# Patient Record
Sex: FEMALE | Race: WHITE | Employment: OTHER | URBAN - METROPOLITAN AREA
[De-identification: names, ages, dates, MRNs, and addresses within clinical notes are randomized per-mention and may not be internally consistent; named-entity substitution may affect disease eponyms.]

---

## 2024-03-11 ENCOUNTER — HOSPITAL ENCOUNTER (OUTPATIENT)
Dept: PHYSICAL THERAPY | Age: 69
Setting detail: THERAPIES SERIES
Discharge: HOME | End: 2024-03-11
Payer: MEDICARE

## 2024-03-11 DIAGNOSIS — M25.551 RIGHT HIP PAIN: Primary | ICD-10-CM

## 2024-03-11 PROCEDURE — 97112 NEUROMUSCULAR REEDUCATION: CPT

## 2024-03-11 PROCEDURE — 97161 PT EVAL LOW COMPLEX 20 MIN: CPT

## 2024-03-11 NOTE — PLAN OF CARE
[facility] - Outpatient Rehabilitation and Therapy [address] [place] [id number] office: [phone] fax: [phone]     Physical Therapy Initial Evaluation Certification      Dear Kyree Florence,*,    We had the pleasure of evaluating the following patient for physical therapy services at [facility].  A summary of our findings can be found in the initial assessment below.  This includes our plan of care.  If you have any questions or concerns regarding these findings, please do not hesitate to contact me at the office phone number listed above.  Thank you for the referral.     Physician Signature:_______________________________Date:__________________  By signing above (or electronic signature), therapist’s plan is approved by physician       Physical Therapy: TREATMENT/PROGRESS NOTE   Patient: Kirstin Chávez (68 y.o. female)   Examination Date: 2024   :  1955 MRN: [id number]   Visit #: 1   Insurance Allowable Auth Needed   BMN []Yes    [x]No    Insurance: Payor: MEDICARE / Plan: MEDICARE PART A AND B / Product Type: *No Product type* /   Insurance ID: [id number] - (Medicare)  Secondary Insurance (if applicable): [facility]   Treatment Diagnosis:     ICD-10-CM    1. Right hip pain  M25.551          Medical Diagnosis:  Pain in right hip [M25.551]   Referring Physician: Kyree Florence,*  PCP: Demetri Moody       Plan of care signed (Y/N):     Date of Patient follow up with Physician:      Progress Report/POC: EVAL today  POC update due: (10 visits /OR AUTH LIMITS, whichever is less)  4/10/2024                                             Precautions/ Contra-indications:           Latex allergy:  NO  Pacemaker:    NO  Contraindications for Manipulation: None  Date of Surgery: NA  Other:    Red Flags:  None    C-SSRS Triggered by Intake questionnaire:   [x] No, Questionnaire did not trigger screening.   [] Yes, Patient intake

## 2024-03-14 ENCOUNTER — HOSPITAL ENCOUNTER (OUTPATIENT)
Dept: PHYSICAL THERAPY | Age: 69
Setting detail: THERAPIES SERIES
Discharge: HOME | End: 2024-03-14
Payer: MEDICARE

## 2024-03-14 PROCEDURE — 97140 MANUAL THERAPY 1/> REGIONS: CPT

## 2024-03-14 PROCEDURE — 97112 NEUROMUSCULAR REEDUCATION: CPT

## 2024-03-14 NOTE — FLOWSHEET NOTE
up>down, hills (inclines>declines), prolonged sitting, elliptical     Relevant Medical History: L wrist fracture surgery March 2022. Borderline high BP with no medical intervention other than CPAP use for sleep apnea. July 2018 Cervical fusion C5-C7. February 2013 arthro surface implant R great toe.    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 Patient would eventually like to do something more structured.     OBJECTIVE EXAMINATION     3/11/24  ROM/Strength: (Blank cells denote NT)      Mvmt (norm) AROM L AROM R Notes PROM L PROM R Notes               LUMBAR Flex (90) 120        Ext (25) 30        SB (25) WNL  WNL Stretch in L sacrum. Limited SB in lumbar spine       Rotation (30) Restricted. Restricted. Primarily likes to rotate from hips.                    HIP Flex (120)    120 120     Abd (45)          ER (50)    35 46     IR (45)    45 35     Ext (20)                   KNEE Flex (140)          Ext (0)                     ANKLE DF (20)          PF (50)          Inversion (30)          Eversion (20)             MMT L          MMT  R Notes     LUMBAR  Flexion       Extension       Lateral flexion        Rotation          MMT L MMT R Notes       HIP  Flexion        Abduction 15.6# 3+/5 p!      ER 26.2# 23.9# p!      IR        Extension 13.6# 12.4# p!           KNEE  Flexion        Extension               ANKLE  DF        PF        Inversion        Eversion        Repeated Movements: [] Normal  [] Abnormal [x] N/A    Palpation:   Patient reported tenderness with palpation  Location: R glute med/min, piriformis, posterior to greater trochanter along gluteal tendons, deep rotators    Posture:   decreased lumbar lordosis and decreased gluteal muscle bulk and tone, especially in glute med/min area    Specific Joint Mobility

## 2024-03-19 ENCOUNTER — HOSPITAL ENCOUNTER (OUTPATIENT)
Dept: PHYSICAL THERAPY | Age: 69
Setting detail: THERAPIES SERIES
Discharge: HOME | End: 2024-03-19
Payer: MEDICARE

## 2024-03-19 PROCEDURE — 97112 NEUROMUSCULAR REEDUCATION: CPT

## 2024-03-19 PROCEDURE — 97140 MANUAL THERAPY 1/> REGIONS: CPT

## 2024-03-19 NOTE — FLOWSHEET NOTE
[facility] - Outpatient Rehabilitation and Therapy [address] [place] [address] office: [phone] fax: [phone]    Physical Therapy: TREATMENT/PROGRESS NOTE   Patient: Kirstin Chávez (68 y.o. female)   Examination Date: 2024   :  1955 MRN: [id number]   Visit #: 3   Insurance Allowable Auth Needed   BMN []Yes    [x]No    Insurance: Payor: MEDICARE / Plan: MEDICARE PART A AND B / Product Type: *No Product type* /   Insurance ID: [id number] - (Medicare)  Secondary Insurance (if applicable): [facility]   Treatment Diagnosis:   1. Right hip pain  M25.551        Medical Diagnosis:  Pain in right hip [M25.551]   Referring Physician: Kyree Florence,*  PCP: Demetri Moody       Plan of care signed (Y/N):     Date of Patient follow up with Physician:      Progress Report/POC: 24  POC update due: (10 visits /OR AUTH LIMITS, whichever is less)  2024                                             Precautions/ Contra-indications:           Latex allergy:  NO  Pacemaker:    NO  Contraindications for Manipulation: None  Date of Surgery: NA  Other:    Red Flags:  None    C-SSRS Triggered by Intake questionnaire:   [x] No, Questionnaire did not trigger screening.   [] Yes, Patient intake triggered further evaluation      [] C-SSRS Screening completed  [] PCP notified via Plan of Care  [] Emergency services notified     Preferred Language for Healthcare:   [x] English       [] other:    SUBJECTIVE EXAMINATION     Patient stated complaint: Admits that her R hip was very irritable and sore the rest of the day following previous session. Patient had an MRI on her hips this past Monday, which showed a partial glute med tear on both her R & L sides as well as a partial hamstring tear at its insertion on the pelvis on the R side and an anterior R hip labrum tear. Patient follows back up with Dr. Florence this Thursday.        Test used Initial score  3/11/24 2024   Pain

## 2024-03-21 ENCOUNTER — HOSPITAL ENCOUNTER (OUTPATIENT)
Dept: PHYSICAL THERAPY | Age: 69
Setting detail: THERAPIES SERIES
Discharge: HOME | End: 2024-03-21
Payer: MEDICARE

## 2024-03-21 PROCEDURE — 97110 THERAPEUTIC EXERCISES: CPT

## 2024-03-21 PROCEDURE — 97140 MANUAL THERAPY 1/> REGIONS: CPT

## 2024-03-21 NOTE — FLOWSHEET NOTE
Timed Code Tx Minutes 47 3       Total Treatment Minutes 47        Charge Justification:  (67914) THERAPEUTIC EXERCISE - Provided verbal/tactile cueing for activities related to strengthening, flexibility, endurance, ROM performed to prevent loss of range of motion, maintain or improve muscular strength or increase flexibility, following either an injury or surgery.   (00060) HOME EXERCISE PROGRAM - Reviewed/Progressed HEP activities related to strengthening, flexibility, endurance, ROM performed to prevent loss of range of motion, maintain or improve muscular strength or increase flexibility, following either an injury or surgery.  (73169) NEUROMUSCULAR RE-EDUCATION - Therapeutic procedure, 1 or more areas, each 15 minutes; neuromuscular reeducation of movement, balance, coordination, kinesthetic sense, posture, and/or proprioception for sitting and/or standing activities  (38982) HOME EXERCISE PROGRAM - Reviewed/Progressed HEP activities related to neuromuscular reeducation of movement, balance, coordination, kinesthetic sense, posture, and/or proprioception for sitting and/or standing activities    (83878) THERAPEUTIC ACTIVITY - use of dynamic activities to improve functional performance. (Ex include squatting, ascending/descending stairs, walking, bending, lifting, catching, throwing, pushing, pulling, jumping.)  Direct, one on one contact, billed in 15-minute increments.  (90790) MANUAL THERAPY -  Manual therapy techniques, 1 or more regions, each 15 minutes (Mobilization/manipulation, manual lymphatic drainage, manual traction) for the purpose of modulating pain, promoting relaxation,  increasing ROM, reducing/eliminating soft tissue swelling/inflammation/restriction, improving soft tissue extensibility and allowing for proper ROM for normal function with self care, mobility, lifting and ambulation  (63335) Needle insertion(s) without injection; 1 or 2 muscle(s).  (51180) Needle insertion(s) without injection;

## 2024-03-26 ENCOUNTER — APPOINTMENT (OUTPATIENT)
Dept: PHYSICAL THERAPY | Age: 69
End: 2024-03-26
Payer: MEDICARE

## 2024-03-28 ENCOUNTER — APPOINTMENT (OUTPATIENT)
Dept: PHYSICAL THERAPY | Age: 69
End: 2024-03-28
Payer: MEDICARE

## 2024-04-15 ENCOUNTER — TELEPHONE (OUTPATIENT)
Dept: ORTHOPEDIC SURGERY | Age: 69
End: 2024-04-15

## 2024-04-15 NOTE — TELEPHONE ENCOUNTER
General Question     Subject: MRI IMAGES  Patient and /or Facility Request: Kirstin Chávez   Contact Number:  [phone]     PATIENT CALLING REGARDING IF THE OFFICE ARE ABLE TO PULL THE IMAGES FOR HER HIP MRI FROM [facility]    THE REPORT IS IN CARE EVERYWHERE, BUT IF THE IMAGES ARE NOT THERE , SHE WILL STOP BY [facility]  TO BRING THEM TO HER APPOINTMENT ON 4/22/24.    PLEASE CALL BACK PATIENT AT THE ABOVE NUMBER.

## 2024-04-19 SDOH — HEALTH STABILITY: PHYSICAL HEALTH: ON AVERAGE, HOW MANY DAYS PER WEEK DO YOU ENGAGE IN MODERATE TO STRENUOUS EXERCISE (LIKE A BRISK WALK)?: 3 DAYS

## 2024-04-19 SDOH — HEALTH STABILITY: PHYSICAL HEALTH: ON AVERAGE, HOW MANY MINUTES DO YOU ENGAGE IN EXERCISE AT THIS LEVEL?: 40 MIN

## 2024-04-22 ENCOUNTER — OFFICE VISIT (OUTPATIENT)
Dept: ORTHOPEDIC SURGERY | Age: 69
End: 2024-04-22
Payer: MEDICARE

## 2024-04-22 VITALS — WEIGHT: 130 LBS | BODY MASS INDEX: 21.66 KG/M2 | HEIGHT: 65 IN

## 2024-04-22 DIAGNOSIS — M53.3 SI (SACROILIAC) JOINT DYSFUNCTION: Primary | ICD-10-CM

## 2024-04-22 DIAGNOSIS — M25.551 PAIN OF RIGHT HIP: ICD-10-CM

## 2024-04-22 PROCEDURE — G8420 CALC BMI NORM PARAMETERS: HCPCS | Performed by: ORTHOPAEDIC SURGERY

## 2024-04-22 PROCEDURE — 1036F TOBACCO NON-USER: CPT | Performed by: ORTHOPAEDIC SURGERY

## 2024-04-22 PROCEDURE — 1123F ACP DISCUSS/DSCN MKR DOCD: CPT | Performed by: ORTHOPAEDIC SURGERY

## 2024-04-22 PROCEDURE — 3017F COLORECTAL CA SCREEN DOC REV: CPT | Performed by: ORTHOPAEDIC SURGERY

## 2024-04-22 PROCEDURE — G8400 PT W/DXA NO RESULTS DOC: HCPCS | Performed by: ORTHOPAEDIC SURGERY

## 2024-04-22 PROCEDURE — 1090F PRES/ABSN URINE INCON ASSESS: CPT | Performed by: ORTHOPAEDIC SURGERY

## 2024-04-22 PROCEDURE — G8427 DOCREV CUR MEDS BY ELIG CLIN: HCPCS | Performed by: ORTHOPAEDIC SURGERY

## 2024-04-22 PROCEDURE — 99204 OFFICE O/P NEW MOD 45 MIN: CPT | Performed by: ORTHOPAEDIC SURGERY

## 2024-04-22 RX ORDER — ROSUVASTATIN CALCIUM 5 MG/1
5 TABLET, COATED ORAL EVERY OTHER DAY
COMMUNITY
Start: 2024-03-13

## 2024-04-22 NOTE — PROGRESS NOTES
Social History     Socioeconomic History    Marital status:      Spouse name: None    Number of children: None    Years of education: None    Highest education level: None   Tobacco Use    Smoking status: Never     Social Determinants of Health     Physical Activity: Insufficiently Active (4/19/2024)    Exercise Vital Sign     Days of Exercise per Week: 3 days     Minutes of Exercise per Session: 40 min       Current Outpatient Medications   Medication Sig Dispense Refill    rosuvastatin (CRESTOR) 5 MG tablet Take 1 tablet by mouth every other day       No current facility-administered medications for this visit.       Allergies   Allergen Reactions    Codeine      Upset stomach       Vital signs:  Ht 1.638 m (5' 4.5\")   Wt 59 kg (130 lb)   BMI 21.97 kg/m²        Constitutional: The physical examination finds the patient to be well-developed and well-nourished.  The patient is alert and oriented x3 and was cooperative throughout the visit.  Neuro: no focal deficits noted. Normal mood, judgement, decision making  Eyes: sclera clear, EOMI  Ears: Normal external ear  Mouth:  No perioral lesions  Pulm: Respirations unlabored and regular  Pulse: Extremities well perfused, warm, capillary refill < 2 seconds  Musculoskeletal:        Hip Examination: right    Skin/Inspection: No skin lesions, cellulitis, or extreme edema in the lower extremities.     Standing/Walking: normal gait, negative Trendelenburg sign.      Supine/Side Lying Exam: Non tender around the major bony prominences  full range with pain  Flexion arc 0 to 120 degrees flexion arc, IR 25 degrees, ER 45 degrees   Deep Flexion test Positive  FADIR Positive  KRISTINA Positive for SI joint pain mostly  Resisted Abduction 4+ to 5/5  quite good   Resisted Adduction 5/5   Resisted  Flexion 5/5  Athletic Pubalgia: negative   Only mild tender at greater trochanter and glut insertion  Leg Length: equal    Prone: Absent  hip flexion contracture   Non tender

## 2024-04-23 PROBLEM — S62.102A: Status: ACTIVE | Noted: 2022-02-21

## 2024-04-23 PROBLEM — S52.502A CLOSED FRACTURE OF LEFT DISTAL RADIUS: Status: ACTIVE | Noted: 2022-03-03

## 2024-04-23 PROBLEM — M48.02 CERVICAL STENOSIS OF SPINE: Status: ACTIVE | Noted: 2018-05-20

## 2024-04-23 PROBLEM — M18.12 PRIMARY OSTEOARTHRITIS OF FIRST CARPOMETACARPAL JOINT OF LEFT HAND: Status: ACTIVE | Noted: 2017-11-14

## 2024-04-23 PROBLEM — G47.33 OSA (OBSTRUCTIVE SLEEP APNEA): Status: ACTIVE | Noted: 2023-01-03

## 2024-04-23 PROBLEM — Z98.1 S/P CERVICAL SPINAL FUSION: Status: ACTIVE | Noted: 2018-07-24

## 2024-04-23 PROBLEM — M54.12 CERVICAL RADICULOPATHY: Status: ACTIVE | Noted: 2018-05-20

## 2024-04-23 PROBLEM — M25.551 PAIN OF RIGHT HIP JOINT: Status: ACTIVE | Noted: 2024-03-13

## 2024-04-23 RX ORDER — CELECOXIB 200 MG/1
200 CAPSULE ORAL 2 TIMES DAILY
COMMUNITY
Start: 2022-11-11

## 2024-04-23 RX ORDER — MELOXICAM 7.5 MG/1
7.5 TABLET ORAL 2 TIMES DAILY
COMMUNITY
Start: 2024-03-21

## 2024-04-23 RX ORDER — PRAVASTATIN SODIUM 20 MG
20 TABLET ORAL DAILY
COMMUNITY
Start: 2024-02-02

## 2024-04-24 ENCOUNTER — OFFICE VISIT (OUTPATIENT)
Dept: ORTHOPEDIC SURGERY | Age: 69
End: 2024-04-24
Payer: MEDICARE

## 2024-04-24 VITALS — BODY MASS INDEX: 21.66 KG/M2 | WEIGHT: 130 LBS | HEIGHT: 65 IN

## 2024-04-24 DIAGNOSIS — G89.29 CHRONIC RIGHT SI JOINT PAIN: ICD-10-CM

## 2024-04-24 DIAGNOSIS — M53.3 SI (SACROILIAC) JOINT DYSFUNCTION: Primary | ICD-10-CM

## 2024-04-24 DIAGNOSIS — M53.3 CHRONIC RIGHT SI JOINT PAIN: ICD-10-CM

## 2024-04-24 PROCEDURE — 99202 OFFICE O/P NEW SF 15 MIN: CPT | Performed by: INTERNAL MEDICINE

## 2024-04-24 PROCEDURE — G8427 DOCREV CUR MEDS BY ELIG CLIN: HCPCS | Performed by: INTERNAL MEDICINE

## 2024-04-24 PROCEDURE — 20605 DRAIN/INJ JOINT/BURSA W/O US: CPT | Performed by: INTERNAL MEDICINE

## 2024-04-24 PROCEDURE — G8400 PT W/DXA NO RESULTS DOC: HCPCS | Performed by: INTERNAL MEDICINE

## 2024-04-24 PROCEDURE — G8420 CALC BMI NORM PARAMETERS: HCPCS | Performed by: INTERNAL MEDICINE

## 2024-04-24 PROCEDURE — 1123F ACP DISCUSS/DSCN MKR DOCD: CPT | Performed by: INTERNAL MEDICINE

## 2024-04-24 PROCEDURE — 1090F PRES/ABSN URINE INCON ASSESS: CPT | Performed by: INTERNAL MEDICINE

## 2024-04-24 PROCEDURE — 3017F COLORECTAL CA SCREEN DOC REV: CPT | Performed by: INTERNAL MEDICINE

## 2024-04-24 PROCEDURE — 1036F TOBACCO NON-USER: CPT | Performed by: INTERNAL MEDICINE

## 2024-04-24 RX ORDER — LIDOCAINE HYDROCHLORIDE 10 MG/ML
5 INJECTION, SOLUTION INFILTRATION; PERINEURAL ONCE
Status: COMPLETED | OUTPATIENT
Start: 2024-04-24 | End: 2024-04-24

## 2024-04-24 RX ORDER — ROPIVACAINE HYDROCHLORIDE 5 MG/ML
2 INJECTION, SOLUTION EPIDURAL; INFILTRATION; PERINEURAL ONCE
Status: COMPLETED | OUTPATIENT
Start: 2024-04-24 | End: 2024-04-24

## 2024-04-24 RX ORDER — METHYLPREDNISOLONE ACETATE 40 MG/ML
40 INJECTION, SUSPENSION INTRA-ARTICULAR; INTRALESIONAL; INTRAMUSCULAR; SOFT TISSUE ONCE
Status: COMPLETED | OUTPATIENT
Start: 2024-04-24 | End: 2024-04-24

## 2024-04-24 RX ADMIN — ROPIVACAINE HYDROCHLORIDE 2 ML: 5 INJECTION, SOLUTION EPIDURAL; INFILTRATION; PERINEURAL at 12:16

## 2024-04-24 RX ADMIN — LIDOCAINE HYDROCHLORIDE 5 ML: 10 INJECTION, SOLUTION INFILTRATION; PERINEURAL at 12:16

## 2024-04-24 RX ADMIN — METHYLPREDNISOLONE ACETATE 40 MG: 40 INJECTION, SUSPENSION INTRA-ARTICULAR; INTRALESIONAL; INTRAMUSCULAR; SOFT TISSUE at 12:16

## 2024-04-30 NOTE — PROGRESS NOTES
then advanced in the same needle tract and the needle  was advanced into the sacroiliac joint adjusting the position of the needle tip as needed to ensure intra-articular entry.  Loss of resistance was appreciated and the injection was completed with 1 cc of Depo-Medrol and 2 cc of 0.5% ropivacaine.  Patient tolerated this with minimal discomfort.    Images stored.    Band-Aid to seal the puncture wound    Technically successful injection           Other Outside Imaging and Testing Personally Reviewed:    US GUIDED NEEDLE PLACEMENT    Result Date: 4/24/2024  Radiology result is complete; follow up with provider / physician office for radiology results              Assessment   Impression: .    Encounter Diagnoses   Name Primary?    SI (sacroiliac) joint dysfunction Yes    Chronic right SI joint pain               Plan:     Postinjection protocol and follow up with Dr. Monserrat Yusuf  as scheduled.   Pain diary provided         Orders:        Orders Placed This Encounter   Procedures    US GUIDED NEEDLE PLACEMENT     Standing Status:   Future     Number of Occurrences:   1     Standing Expiration Date:   4/24/2025     Order Specific Question:   Reason for exam:     Answer:   CSI    WA ARTHROCENTESIS ASPIR&/INJ INTERM JT/BURS W/O US         Pradip Madrigal MD.      Disclaimer:    \"This note was dictated with voice recognition software. Though review and correction are routine, we apologize for any errors.\"

## 2024-05-07 ENCOUNTER — OFFICE VISIT (OUTPATIENT)
Dept: ORTHOPEDIC SURGERY | Age: 69
End: 2024-05-07
Payer: MEDICARE

## 2024-05-07 ENCOUNTER — TELEPHONE (OUTPATIENT)
Dept: ORTHOPEDIC SURGERY | Age: 69
End: 2024-05-07

## 2024-05-07 VITALS — HEIGHT: 65 IN | BODY MASS INDEX: 21.99 KG/M2 | RESPIRATION RATE: 12 BRPM | WEIGHT: 132 LBS

## 2024-05-07 DIAGNOSIS — M16.11 PRIMARY OSTEOARTHRITIS OF RIGHT HIP: Primary | ICD-10-CM

## 2024-05-07 PROCEDURE — 99213 OFFICE O/P EST LOW 20 MIN: CPT

## 2024-05-07 PROCEDURE — G8400 PT W/DXA NO RESULTS DOC: HCPCS

## 2024-05-07 PROCEDURE — 1123F ACP DISCUSS/DSCN MKR DOCD: CPT

## 2024-05-07 PROCEDURE — 3017F COLORECTAL CA SCREEN DOC REV: CPT

## 2024-05-07 PROCEDURE — 1036F TOBACCO NON-USER: CPT

## 2024-05-07 PROCEDURE — 1090F PRES/ABSN URINE INCON ASSESS: CPT

## 2024-05-07 PROCEDURE — G8420 CALC BMI NORM PARAMETERS: HCPCS

## 2024-05-07 PROCEDURE — G8427 DOCREV CUR MEDS BY ELIG CLIN: HCPCS

## 2024-05-07 RX ORDER — ROPIVACAINE HYDROCHLORIDE 5 MG/ML
14 INJECTION, SOLUTION EPIDURAL; INFILTRATION; PERINEURAL ONCE
Status: COMPLETED | OUTPATIENT
Start: 2024-05-07 | End: 2024-05-07

## 2024-05-07 RX ORDER — TRIAMCINOLONE ACETONIDE 40 MG/ML
80 INJECTION, SUSPENSION INTRA-ARTICULAR; INTRAMUSCULAR ONCE
Status: COMPLETED | OUTPATIENT
Start: 2024-05-07 | End: 2024-05-07

## 2024-05-07 RX ADMIN — TRIAMCINOLONE ACETONIDE 80 MG: 40 INJECTION, SUSPENSION INTRA-ARTICULAR; INTRAMUSCULAR at 13:53

## 2024-05-07 RX ADMIN — ROPIVACAINE HYDROCHLORIDE 14 ML: 5 INJECTION, SOLUTION EPIDURAL; INFILTRATION; PERINEURAL at 13:53

## 2024-05-07 NOTE — PROGRESS NOTES
5/7/24  1:10 PM        NDC: 95573-753-21   -   Ropivacaine 0.5 %    LOT: [id number]    COMMENT: RIGHT HIP INTRA-ARTICULAR CORTISONE INJECTION      NDC: 3514-1818-36   -   Kenalog 40mg    LOT: [id number]    COMMENT: RIGHT HIP INTRA-ARTICULAR CORTISONE INJECTION

## 2024-05-07 NOTE — TELEPHONE ENCOUNTER
Surgery and/or Procedure Scheduling     Contact Name: SINGH VICTORINO  Surgical/Procedure Request: IAC RT HIP INJECTION  Patient Contact Number: [phone]    PATIENT CALLED TO SPEAK WITH CLINICAL TO SCHEDULE INJECTION-IAC RT HIP    PLEASE ADVISE

## 2024-05-07 NOTE — PROGRESS NOTES
Date:  2024    Name:  Kirstin Chávez  Address:  [address] [place] [id number]    :  1955      Age:   68 y.o.    SSN:  [id number]      Medical Record Number:  [id number]    Reason for Visit:    Chief Complaint    Hip Pain (U/S right hip IAC )      DOS:2024     HPI: Kirstin Chávez is a 68 y.o. female here today for prescheduled right hip IAC. She saw Dr. Madrigal on 24 and underwent SI joint injection. She notes receiving 2 days of relief before symptoms are returned. He is complaining of posterior hip burning pain worse with ambulation.     The patient denies any bowel/bladder symptoms, or any numbness, tingling, or weakness down the affected thigh or leg. The patient denies any prior hip injuries, surgeries, or any childhood history of hip disorders.    Kirstin Chávez is currently retired with her .      Pain Assessment  Location of Pain: Hip  Location Modifiers: Right  Severity of Pain: 3  Quality of Pain: Sharp, Aching, Other (Comment) (BURNING)  Duration of Pain: Persistent  Frequency of Pain: Constant  Aggravating Factors: Bending, Walking, Stairs  Limiting Behavior: Yes  Relieving Factors: Rest  Result of Injury: No  Work-Related Injury: No  Are there other pain locations you wish to document?: No  ROS: Review of systems reviewed from Patient History Form completed today and available in the patient's chart under the Media tab.       History reviewed. No pertinent past medical history.     Past Surgical History:   Procedure Laterality Date    FOOT SURGERY         Family History   Problem Relation Age of Onset    Hypertension Mother     Diabetes Mother     Stroke Father        Social History     Socioeconomic History    Marital status:      Spouse name: None    Number of children: None    Years of education: None    Highest education level: None   Tobacco Use    Smoking status: Never     Social Determinants of Health     Physical Activity: Insufficiently Active (2024)

## 2024-05-10 ENCOUNTER — TELEPHONE (OUTPATIENT)
Dept: ORTHOPEDIC SURGERY | Age: 69
End: 2024-05-10

## 2024-05-10 NOTE — TELEPHONE ENCOUNTER
General Question     Subject: R HIP INJECTION QUESTIONS   Patient and /or Facility Request: Kirstin Chávez   Contact Number: [phone]       PATIENT CALLED IN ABOUT HER RT HIP INJECTION. SHE HAVING STOMACH ISSUE. PTAIENT REQ A CALL BACK..    PLEASE ADVISE

## 2024-06-19 ENCOUNTER — OFFICE VISIT (OUTPATIENT)
Dept: ORTHOPEDIC SURGERY | Age: 69
End: 2024-06-19
Payer: MEDICARE

## 2024-06-19 VITALS — BODY MASS INDEX: 21.99 KG/M2 | HEIGHT: 65 IN | WEIGHT: 132 LBS

## 2024-06-19 DIAGNOSIS — M53.3 SI (SACROILIAC) JOINT DYSFUNCTION: ICD-10-CM

## 2024-06-19 DIAGNOSIS — M16.11 PRIMARY OSTEOARTHRITIS OF RIGHT HIP: Primary | ICD-10-CM

## 2024-06-19 PROCEDURE — G8420 CALC BMI NORM PARAMETERS: HCPCS | Performed by: ORTHOPAEDIC SURGERY

## 2024-06-19 PROCEDURE — 99214 OFFICE O/P EST MOD 30 MIN: CPT | Performed by: ORTHOPAEDIC SURGERY

## 2024-06-19 PROCEDURE — 1036F TOBACCO NON-USER: CPT | Performed by: ORTHOPAEDIC SURGERY

## 2024-06-19 PROCEDURE — 1090F PRES/ABSN URINE INCON ASSESS: CPT | Performed by: ORTHOPAEDIC SURGERY

## 2024-06-19 PROCEDURE — 1123F ACP DISCUSS/DSCN MKR DOCD: CPT | Performed by: ORTHOPAEDIC SURGERY

## 2024-06-19 PROCEDURE — G8400 PT W/DXA NO RESULTS DOC: HCPCS | Performed by: ORTHOPAEDIC SURGERY

## 2024-06-19 PROCEDURE — G8427 DOCREV CUR MEDS BY ELIG CLIN: HCPCS | Performed by: ORTHOPAEDIC SURGERY

## 2024-06-19 PROCEDURE — 3017F COLORECTAL CA SCREEN DOC REV: CPT | Performed by: ORTHOPAEDIC SURGERY

## 2024-06-19 NOTE — PROGRESS NOTES
Chief Complaint  Hip Pain (Right hip)      History of Present Illness:  Kirstin Chávez is a pleasant 69 y.o. female here for reassessment of right buttock dominant and C-spine pain.  She had 4 weeks of relief to a steroid injection given by my physician assistant 6 weeks ago.  It relieved her pain with walking stairs and hiking and sitting.  It felt pretty good.  Since then it has worn off.  Her pain is constant.  No setbacks.  Of note she had no relief to prior SI joint injection by my partner Dr. Madrigal.      Medical History:  Patient's medications, allergies, past medical, surgical, social and family histories were reviewed and updated as appropriate.    Pain Assessment  Location of Pain: Hip  Location Modifiers: Right  Severity of Pain: 2  Quality of Pain: Sharp, Dull  Frequency of Pain: Constant  Aggravating Factors: Walking, Stairs, Other (Comment) (hills, sitting)  Limiting Behavior: Yes  Relieving Factors: Rest, Ice  Result of Injury: No  Work-Related Injury: No  ROS: Review of systems reviewed from Patient History Form completed today and available in the patient's chart under the Media tab.      Pertinent items are noted in HPI  Review of systems reviewed from Patient History Form completed today and available in the patient's chart under the Media tab.       Vital Signs:  Ht 1.651 m (5' 5\")   Wt 59.9 kg (132 lb)   BMI 21.97 kg/m²         Neuro: Alert & oriented x 3,  normal,  no focal deficits noted. Normal affect.  Eyes: sclera clear  Ears: Normal external ear  Mouth:  No perioral lesions  Pulm: Respirations unlabored and regular  Pulse: Extremities well perfused. 2+ peripheral pulses.  Skin: Warm. No ulcerations.      Constitutional: The physical examination finds the patient to be well-developed and well-nourished.  The patient is alert and oriented x3 and was cooperative throughout the visit.    Hip Examination: right    Skin/Inspection: No skin lesions, cellulitis, or extreme edema in the lower

## 2024-07-01 ENCOUNTER — OFFICE VISIT (OUTPATIENT)
Dept: ORTHOPEDIC SURGERY | Age: 69
End: 2024-07-01

## 2024-07-01 VITALS — HEIGHT: 65 IN | BODY MASS INDEX: 21.99 KG/M2 | WEIGHT: 132 LBS

## 2024-07-01 DIAGNOSIS — M16.11 PRIMARY OSTEOARTHRITIS OF RIGHT HIP: Primary | ICD-10-CM

## 2024-07-01 RX ORDER — BUPIVACAINE HYDROCHLORIDE 2.5 MG/ML
5 INJECTION, SOLUTION INFILTRATION; PERINEURAL ONCE
Status: COMPLETED | OUTPATIENT
Start: 2024-07-01 | End: 2024-07-01

## 2024-07-01 RX ORDER — LIDOCAINE HYDROCHLORIDE 10 MG/ML
5 INJECTION, SOLUTION INFILTRATION; PERINEURAL ONCE
Status: COMPLETED | OUTPATIENT
Start: 2024-07-01 | End: 2024-07-01

## 2024-07-01 RX ADMIN — LIDOCAINE HYDROCHLORIDE 5 ML: 10 INJECTION, SOLUTION INFILTRATION; PERINEURAL at 15:46

## 2024-07-01 RX ADMIN — BUPIVACAINE HYDROCHLORIDE 12.5 MG: 2.5 INJECTION, SOLUTION INFILTRATION; PERINEURAL at 15:46

## 2024-07-04 RX ORDER — CELECOXIB 200 MG/1
200 CAPSULE ORAL DAILY PRN
Qty: 60 CAPSULE | Refills: 1 | Status: SHIPPED | OUTPATIENT
Start: 2024-07-04

## 2024-07-04 NOTE — PROGRESS NOTES
effusion      Skin: There are no rashes, ulcerations or lesions.      Gait: Nonantalgic      Neurovascular - non focal and intact       Additional Comments:        Additional Examinations:                    Office Imaging Results/Procedures PerformedToday:        Office Procedures:     Orders Placed This Encounter   Procedures    US MISC LIMITED     Order Specific Question:   Reason for exam:     Answer:   PRP           Other Outside Imaging and Testing Personally Reviewed:    US MISC LIMITED    Result Date: 7/1/2024  Radiology result is complete; follow up with provider / physician office for radiology results              Assessment   Impression: .    Encounter Diagnosis   Name Primary?    Primary osteoarthritis of right hip Yes              Plan:     Postinjection protocol and follow-up in 10 to 14 days  Activity modification -hip precautions and caution against overuse  Medical pain management: NSAID: Celebrex as needed or, Tylenol.  Activity modification for the next 72 hours and transition to maintenance HEP  Long-term follow-up with Dr. MARY Yusuf as scheduled     Orders:        Orders Placed This Encounter   Procedures    US MISC LIMITED     Order Specific Question:   Reason for exam:     Answer:   LO Madrigal MD.      Disclaimer:    \"This note was dictated with voice recognition software. Though review and correction are routine, we apologize for any errors.\"

## 2024-08-05 ENCOUNTER — OFFICE VISIT (OUTPATIENT)
Dept: ORTHOPEDIC SURGERY | Age: 69
End: 2024-08-05
Payer: MEDICARE

## 2024-08-05 VITALS — BODY MASS INDEX: 21.99 KG/M2 | WEIGHT: 132 LBS | HEIGHT: 65 IN

## 2024-08-05 DIAGNOSIS — M16.11 PRIMARY OSTEOARTHRITIS OF RIGHT HIP: Primary | ICD-10-CM

## 2024-08-05 DIAGNOSIS — M25.551 PAIN OF RIGHT HIP: ICD-10-CM

## 2024-08-05 DIAGNOSIS — M54.50 LUMBAR PAIN: ICD-10-CM

## 2024-08-05 PROCEDURE — 99214 OFFICE O/P EST MOD 30 MIN: CPT | Performed by: INTERNAL MEDICINE

## 2024-08-05 PROCEDURE — 1123F ACP DISCUSS/DSCN MKR DOCD: CPT | Performed by: INTERNAL MEDICINE

## 2024-08-05 PROCEDURE — G8400 PT W/DXA NO RESULTS DOC: HCPCS | Performed by: INTERNAL MEDICINE

## 2024-08-05 PROCEDURE — G8420 CALC BMI NORM PARAMETERS: HCPCS | Performed by: INTERNAL MEDICINE

## 2024-08-05 PROCEDURE — 1090F PRES/ABSN URINE INCON ASSESS: CPT | Performed by: INTERNAL MEDICINE

## 2024-08-05 PROCEDURE — G8427 DOCREV CUR MEDS BY ELIG CLIN: HCPCS | Performed by: INTERNAL MEDICINE

## 2024-08-05 PROCEDURE — 3017F COLORECTAL CA SCREEN DOC REV: CPT | Performed by: INTERNAL MEDICINE

## 2024-08-05 PROCEDURE — 1036F TOBACCO NON-USER: CPT | Performed by: INTERNAL MEDICINE

## 2024-08-07 ENCOUNTER — HOSPITAL ENCOUNTER (OUTPATIENT)
Dept: PHYSICAL THERAPY | Age: 69
Setting detail: THERAPIES SERIES
Discharge: HOME | End: 2024-08-07
Payer: MEDICARE

## 2024-08-07 PROCEDURE — 97140 MANUAL THERAPY 1/> REGIONS: CPT

## 2024-08-07 PROCEDURE — 97164 PT RE-EVAL EST PLAN CARE: CPT

## 2024-08-07 NOTE — PLAN OF CARE
miles without increased R hip symptoms or restriction.   Status: [] Progressing: [] Met: [] Not Met: [] Adjusted  Patient will be able to go up at least 2 flights of normal 8\" height stairs with reciprocal mechanics without increased R hip pain or restriction.    Status: [] Progressing: [] Met: [] Not Met: [] Adjusted  Patient will be able to navigate inclines/declines without increased R hip symptoms or restriction.    Status: [] Progressing: [] Met: [] Not Met: [] Adjusted  Patient will be able to tolerate sitting in the car for >4 hours for a road trip without increased R hip symptoms or restriction.            Status: [] Progressing: [] Met: [] Not Met: [] Adjusted    Patient stated goal: ***  [] Progressing: [] Met: [] Not Met: [] Adjusted    Therapist goals for Patient:   Short Term Goals: To be achieved in: 2 weeks  1. Independent in HEP and progression per patient tolerance, in order to prevent re-injury.   [] Progressing: [] Met: [] Not Met: [] Adjusted  2. Patient will have a decrease in pain to <***/10 to facilitate improvement in movement, function, and ADLs as indicated by Functional Deficits.  [] Progressing: [] Met: [] Not Met: [] Adjusted    Long Term Goals: To be achieved in: *** weeks  1. Disability index score of ***% or less for the {outcome measures:61093} to assist with reaching prior level of function with activities such as ***.  [] Progressing: [] Met: [] Not Met: [] Adjusted  2. Patient will demonstrate increased AROM of *** to *** without pain to allow for proper joint functioning to enable patient to ***.   [] Progressing: [] Met: [] Not Met: [] Adjusted  3. Patient will demonstrate increased Strength of *** to {STRENGTHGOAL:26634} to allow for proper functional mobility to enable patient to return to ***.   [] Progressing: [] Met: [] Not Met: [] Adjusted  4. Patient will return to *** without increased symptoms or restriction.   [] Progressing: [] Met: [] Not Met: [] Adjusted  5.

## 2024-08-07 NOTE — PROGRESS NOTES
Chief Complaint:   Chief Complaint   Patient presents with    Hip Pain     Right Hip - PRP follow up with worsening pain          History of Present Illness:       Patient is a 69 y.o. female returns follow up for the above complaint. The patient was last seen approximately 1 monthsago. The symptoms show no change since the last visit. The patient has had no further testing for the problem.      Status post ultrasound-guided right hip intra-articular PRP PRGF-ENDORET- 7/1/2024    W OM AC 40, prior 40    No appreciable subjective improvement.    Pain levels 1-4/10    Majority of her pain is posterior greater than lateral    No mechanical symptoms no subjective instability           Past Medical History:      No past medical history on file.     Present Medications:         Current Outpatient Medications   Medication Sig Dispense Refill    celecoxib (CELEBREX) 200 MG capsule Take 1 capsule by mouth daily as needed for Pain 60 capsule 1    diclofenac sodium (VOLTAREN) 1 % GEL Apply 2 g topically 4 times daily as needed for Pain      rosuvastatin (CRESTOR) 5 MG tablet Take 1 tablet by mouth every other day       No current facility-administered medications for this visit.         Allergies:        Allergies   Allergen Reactions    Codeine Shortness Of Breath, Other (See Comments) and Palpitations     Upset stomach    tachycardia    tachycardia   Upset stomach   tachycardia   Upset stomach    tachycardia   Upset stomach           Review of Systems:    Pertinent items are noted in HPI     Vital Signs:    There were no vitals filed for this visit.     General Exam:     Constitutional: Patient is adequately groomed with no evidence of malnutrition    Physical Exam: right hip      Primary Exam:    Inspection: No deformity attributable curvature      Palpation: No focal trigger point tenderness      Range of Motion: Near full range and symmetric with only low-grade discomfort in end ranges      Strength: Normal with and

## 2024-08-09 ENCOUNTER — HOSPITAL ENCOUNTER (OUTPATIENT)
Dept: PHYSICAL THERAPY | Age: 69
Setting detail: THERAPIES SERIES
Discharge: HOME | End: 2024-08-09
Payer: MEDICARE

## 2024-08-09 PROCEDURE — 97140 MANUAL THERAPY 1/> REGIONS: CPT

## 2024-08-09 PROCEDURE — 97032 APPL MODALITY 1+ESTIM EA 15: CPT

## 2024-08-09 PROCEDURE — 20560 NDL INSJ W/O NJX 1 OR 2 MUSC: CPT

## 2024-08-09 NOTE — FLOWSHEET NOTE
function.   Status: [] Progressing: [] Met: [] Not Met: [] Adjusted  Patient will be able to walk >2 miles without increased R hip symptoms or restriction.   Status: [] Progressing: [] Met: [] Not Met: [] Adjusted  Patient will be able to go up at least 2 flights of normal 8\" height stairs with reciprocal mechanics without increased R hip pain or restriction.    Status: [] Progressing: [] Met: [] Not Met: [] Adjusted  Patient will be able to navigate inclines/declines without increased R hip symptoms or restriction.    Status: [] Progressing: [] Met: [] Not Met: [] Adjusted  Patient will be able to tolerate sitting in the car for >4 hours for a road trip without increased R hip symptoms or restriction.            Status: [] Progressing: [] Met: [] Not Met: [] Adjusted    Overall Progression Towards Functional goals/ Treatment Progress Update:  [] Patient is progressing as expected towards functional goals listed.    [] Progression is slowed due to complexities/Impairments listed.  [] Progression has been slowed due to co-morbidities.  [x] Plan just implemented, too soon (<30days) to assess goals progression   [] Goals require adjustment due to lack of progress  [] Patient is not progressing as expected and requires additional follow up with physician  [] Other:     TREATMENT PLAN     Frequency/Duration: 2x/week for 4-6 weeks for the following treatment interventions:    Interventions:  Therapeutic Exercise (36953) including: strength training, ROM, and functional mobility  Therapeutic Activities (98515) including: functional mobility training and education.  Neuromuscular Re-education (24219) activation and proprioception, including postural re-education.    Manual Therapy (29487) as indicated to include: Passive Range of Motion, Gr I-IV mobilizations, Soft Tissue Mobilization, and Dry Needling/IASTM    Plan:  continue DN and manual working into extension.     Electronically Signed by Lyubov Bennett, PT  Date:

## 2024-08-12 ENCOUNTER — HOSPITAL ENCOUNTER (OUTPATIENT)
Dept: PHYSICAL THERAPY | Age: 69
Setting detail: THERAPIES SERIES
Discharge: HOME | End: 2024-08-12
Payer: MEDICARE

## 2024-08-12 PROCEDURE — 97140 MANUAL THERAPY 1/> REGIONS: CPT

## 2024-08-12 NOTE — FLOWSHEET NOTE
[facility] - Outpatient Rehabilitation and Therapy [address] [place] [address] office: [phone] fax: [phone]           Physical Therapy: TREATMENT/PROGRESS NOTE   Patient: Kirstin Chávez (69 y.o. female)   Examination Date: 2024   :  1955 MRN: [id number]   Visit #: 7   Insurance Allowable Auth Needed   MN []Yes    [x]No    Insurance: Payor: MEDICARE / Plan: MEDICARE PART A AND B / Product Type: *No Product type* /   Insurance ID: [id number] - (Medicare)  Secondary Insurance (if applicable): [facility]   Treatment Diagnosis: R hip pain   Medical Diagnosis:  Primary osteoarthritis of right hip [M16.11]  Pain of right hip [M25.551]  Lumbar pain [M54.50]   Referring Physician: Pradip Madrigal*  PCP: Unknown, Provider, APRN - NP     Plan of care signed (Y/N):     Date of Patient follow up with Physician:      Plan of Care Report: NO  POC update due: (10 visits /OR AUTH LIMITS, whichever is less)  2024                                             Medical History:  Relevant Medical History: L wrist fracture surgery 2022. Borderline high BP with no medical intervention other than CPAP use for sleep apnea. 2018 Cervical fusion C5-C7. 2013 arthro surface implant R great toe.                                         Precautions/ Contra-indications:           Latex allergy:  NO  Pacemaker:    NO  Contraindications for Manipulation: None  Date of Surgery: NA  Other:    Red Flags:  None    Suicide Screening:   The patient did not verbalize a primary behavioral concern, suicidal ideation, suicidal intent, or demonstrate suicidal behaviors.    Preferred Language for Healthcare:   [x] English       [] other:    SUBJECTIVE EXAMINATION     Patient stated complaint: Kirstin felt significant improvement after DN last session, less piriformis pain, could walk, felt looser and better able to move.        Test used Initial score  2024   Pain Summary

## 2024-08-14 ENCOUNTER — HOSPITAL ENCOUNTER (OUTPATIENT)
Dept: PHYSICAL THERAPY | Age: 69
Setting detail: THERAPIES SERIES
Discharge: HOME | End: 2024-08-14
Payer: MEDICARE

## 2024-08-14 PROCEDURE — 97140 MANUAL THERAPY 1/> REGIONS: CPT

## 2024-08-14 PROCEDURE — 97032 APPL MODALITY 1+ESTIM EA 15: CPT

## 2024-08-14 PROCEDURE — 20560 NDL INSJ W/O NJX 1 OR 2 MUSC: CPT

## 2024-08-14 NOTE — FLOWSHEET NOTE
[facility] - Outpatient Rehabilitation and Therapy [address] [place] [address] office: [phone] fax: [phone]           Physical Therapy: TREATMENT/PROGRESS NOTE   Patient: Kirstin Chávez (69 y.o. female)   Examination Date: 2024   :  1955 MRN: [id number]   Visit #: 8   Insurance Allowable Auth Needed   MN []Yes    [x]No    Insurance: Payor: MEDICARE / Plan: MEDICARE PART A AND B / Product Type: *No Product type* /   Insurance ID: [id number] - (Medicare)  Secondary Insurance (if applicable): [facility]   Treatment Diagnosis: R hip pain   Medical Diagnosis:  Primary osteoarthritis of right hip [M16.11]  Pain of right hip [M25.551]  Lumbar pain [M54.50]   Referring Physician: Pradip Madrigal*  PCP: Unknown, Provider, APRN - NP     Plan of care signed (Y/N):     Date of Patient follow up with Physician:      Plan of Care Report: NO  POC update due: (10 visits /OR AUTH LIMITS, whichever is less)  2024                                             Medical History:  Relevant Medical History: L wrist fracture surgery 2022. Borderline high BP with no medical intervention other than CPAP use for sleep apnea. 2018 Cervical fusion C5-C7. 2013 arthro surface implant R great toe.                                         Precautions/ Contra-indications:           Latex allergy:  NO  Pacemaker:    NO  Contraindications for Manipulation: None  Date of Surgery: NA  Other:    Red Flags:  None    Suicide Screening:   The patient did not verbalize a primary behavioral concern, suicidal ideation, suicidal intent, or demonstrate suicidal behaviors.    Preferred Language for Healthcare:   [x] English       [] other:    SUBJECTIVE EXAMINATION     Patient stated complaint: Flare up after last session, sharp pain and difficulty walking. Did feel better by next day after taking celebrex, doing stretches. Today more c-shape pattern of pain.        Test used Initial

## 2024-08-21 ENCOUNTER — OFFICE VISIT (OUTPATIENT)
Dept: ORTHOPEDIC SURGERY | Age: 69
End: 2024-08-21
Payer: MEDICARE

## 2024-08-21 VITALS — WEIGHT: 132 LBS | HEIGHT: 65 IN | BODY MASS INDEX: 21.99 KG/M2

## 2024-08-21 DIAGNOSIS — M16.11 PRIMARY OSTEOARTHRITIS OF RIGHT HIP: Primary | ICD-10-CM

## 2024-08-21 PROCEDURE — 1036F TOBACCO NON-USER: CPT

## 2024-08-21 PROCEDURE — 99213 OFFICE O/P EST LOW 20 MIN: CPT

## 2024-08-21 PROCEDURE — 3017F COLORECTAL CA SCREEN DOC REV: CPT

## 2024-08-21 PROCEDURE — 1123F ACP DISCUSS/DSCN MKR DOCD: CPT

## 2024-08-21 PROCEDURE — G8427 DOCREV CUR MEDS BY ELIG CLIN: HCPCS

## 2024-08-21 PROCEDURE — 1090F PRES/ABSN URINE INCON ASSESS: CPT

## 2024-08-21 PROCEDURE — G8400 PT W/DXA NO RESULTS DOC: HCPCS

## 2024-08-21 PROCEDURE — G8420 CALC BMI NORM PARAMETERS: HCPCS

## 2024-08-22 ENCOUNTER — TELEPHONE (OUTPATIENT)
Dept: ORTHOPEDIC SURGERY | Age: 69
End: 2024-08-22

## 2024-08-23 ENCOUNTER — HOSPITAL ENCOUNTER (OUTPATIENT)
Dept: PHYSICAL THERAPY | Age: 69
Setting detail: THERAPIES SERIES
Discharge: HOME | End: 2024-08-23
Payer: MEDICARE

## 2024-08-23 PROCEDURE — 97140 MANUAL THERAPY 1/> REGIONS: CPT

## 2024-08-23 NOTE — FLOWSHEET NOTE
[facility] - Outpatient Rehabilitation and Therapy [address] [place] [address] office: [phone] fax: [phone]           Physical Therapy: TREATMENT/PROGRESS NOTE   Patient: Kirstin Chávez (69 y.o. female)   Examination Date: 2024   :  1955 MRN: [id number]   Visit #: 9   Insurance Allowable Auth Needed   MN []Yes    [x]No    Insurance: Payor: MEDICARE / Plan: MEDICARE PART A AND B / Product Type: *No Product type* /   Insurance ID: [id number] - (Medicare)  Secondary Insurance (if applicable): [facility]   Treatment Diagnosis: R hip pain   Medical Diagnosis:  Primary osteoarthritis of right hip [M16.11]  Pain of right hip [M25.551]  Lumbar pain [M54.50]   Referring Physician: Pradip Madrigal*  PCP: Unknown, Provider     Plan of care signed (Y/N):     Date of Patient follow up with Physician:      Plan of Care Report: NO  POC update due: (10 visits /OR AUTH LIMITS, whichever is less)  2024                                             Medical History:  Relevant Medical History: L wrist fracture surgery 2022. Borderline high BP with no medical intervention other than CPAP use for sleep apnea. 2018 Cervical fusion C5-C7. 2013 arthro surface implant R great toe.                                         Precautions/ Contra-indications:           Latex allergy:  NO  Pacemaker:    NO  Contraindications for Manipulation: None  Date of Surgery: NA  Other:    Red Flags:  None    Suicide Screening:   The patient did not verbalize a primary behavioral concern, suicidal ideation, suicidal intent, or demonstrate suicidal behaviors.    Preferred Language for Healthcare:   [x] English       [] other:    SUBJECTIVE EXAMINATION     Patient stated complaint: Saw MD who is in agreement of hip pathology. They would both like to do a few more PT sessions to see if we can prepare hip to either avoid surgery or be ready for surgery.        Test used Initial

## 2024-08-23 NOTE — PROGRESS NOTES
Chief Complaint  Hip Pain (Right hip)      History of Present Illness:  Kirstin Chávez is a pleasant 69 y.o. female here for reassessment of right buttock dominant and C-spine pain.  She underwent Right hip PRP injection with Dr. Madrigal on 7/1/24 without relief to her symptoms. Symptoms constant, worse with prolonged activity. The pain is significantly limiting her recreational activity.       Medical History:  Patient's medications, allergies, past medical, surgical, social and family histories were reviewed and updated as appropriate.    Pain Assessment  Location of Pain: Hip  Location Modifiers: Right  Severity of Pain: 5  Quality of Pain: Sharp, Aching, Throbbing, Other (Comment) (STABBING/BURNING)  Frequency of Pain: Intermittent  Aggravating Factors: Walking, Stairs, Other (Comment) (HILLS)  Limiting Behavior: Yes  Relieving Factors: Rest, Ice, Nsaids  Result of Injury: No  Work-Related Injury: No  ROS: Review of systems reviewed from Patient History Form completed today and available in the patient's chart under the Media tab.      Pertinent items are noted in HPI  Review of systems reviewed from Patient History Form completed today and available in the patient's chart under the Media tab.       Vital Signs:  Ht 1.651 m (5' 5\")   Wt 59.9 kg (132 lb)   BMI 21.97 kg/m²         Neuro: Alert & oriented x 3,  normal,  no focal deficits noted. Normal affect.  Eyes: sclera clear  Ears: Normal external ear  Mouth:  No perioral lesions  Pulm: Respirations unlabored and regular  Pulse: Extremities well perfused. 2+ peripheral pulses.  Skin: Warm. No ulcerations.      Constitutional: The physical examination finds the patient to be well-developed and well-nourished.  The patient is alert and oriented x3 and was cooperative throughout the visit.    Hip Examination: right    Skin/Inspection: No skin lesions, cellulitis, or extreme edema in the lower extremities.     Standing/Walking: normal gait, negative

## 2024-08-30 ENCOUNTER — HOSPITAL ENCOUNTER (OUTPATIENT)
Dept: PHYSICAL THERAPY | Age: 69
Setting detail: THERAPIES SERIES
Discharge: HOME | End: 2024-08-30
Payer: MEDICARE

## 2024-08-30 PROCEDURE — 97140 MANUAL THERAPY 1/> REGIONS: CPT

## 2024-08-30 NOTE — FLOWSHEET NOTE
[facility] - Outpatient Rehabilitation and Therapy [address] [place] [address] office: [phone] fax: [phone]           Physical Therapy: TREATMENT/PROGRESS NOTE   Patient: Kirstin Chávez (69 y.o. female)   Examination Date: 2024   :  1955 MRN: [id number]   Visit #: 10   Insurance Allowable Auth Needed   MN []Yes    [x]No    Insurance: Payor: MEDICARE / Plan: MEDICARE PART A AND B / Product Type: *No Product type* /   Insurance ID: [id number] - (Medicare)  Secondary Insurance (if applicable): [facility]   Treatment Diagnosis: R hip pain   Medical Diagnosis:  Primary osteoarthritis of right hip [M16.11]  Pain of right hip [M25.551]  Lumbar pain [M54.50]   Referring Physician: Pradip Madrigal* /Dr. Yusuf PCP: Unknown, Provider     Plan of care signed (Y/N):     Date of Patient follow up with Physician:      Plan of Care Report: NO  POC update due: (10 visits /OR AUTH LIMITS, whichever is less)  2024                                             Medical History:  Relevant Medical History: L wrist fracture surgery 2022. Borderline high BP with no medical intervention other than CPAP use for sleep apnea. 2018 Cervical fusion C5-C7. 2013 arthro surface implant R great toe.                                         Precautions/ Contra-indications:           Latex allergy:  NO  Pacemaker:    NO  Contraindications for Manipulation: None  Date of Surgery: NA  Other:    Red Flags:  None    Suicide Screening:   The patient did not verbalize a primary behavioral concern, suicidal ideation, suicidal intent, or demonstrate suicidal behaviors.    Preferred Language for Healthcare:   [x] English       [] other:    SUBJECTIVE EXAMINATION     Patient stated complaint: Hip has been sore most times, waking her up at night. Pretty much any activity, including sitting flares her up. Starts with glute, then whole hip including front tightens up.        Test used  Justification:  (56482) THERAPEUTIC EXERCISE - Provided verbal/tactile cueing for activities related to strengthening, flexibility, endurance, ROM performed to prevent loss of range of motion, maintain or improve muscular strength or increase flexibility, following either an injury or surgery.   (96632) MANUAL THERAPY -  Manual therapy techniques, 1 or more regions, each 15 minutes (Mobilization/manipulation, manual lymphatic drainage, manual traction) for the purpose of modulating pain, promoting relaxation,  increasing ROM, reducing/eliminating soft tissue swelling/inflammation/restriction, improving soft tissue extensibility and allowing for proper ROM for normal function with self care, mobility, lifting and ambulation    GOALS     Patient stated goal: sit painfree, walk without pain.   [] Progressing: [] Met: [] Not Met: [] Adjusted    Therapist goals for Patient:   Short Term Goals: To be achieved in: 2 weeks  1. Independent in HEP and progression per patient tolerance, in order to prevent re-injury.   [] Progressing: [] Met: [] Not Met: [] Adjusted  2. Patient will have a decrease in pain to <2/10 to facilitate improvement in movement, function, and ADLs as indicated by Functional Deficits.  [] Progressing: [] Met: [] Not Met: [] Adjusted      Long Term Goals: To be achieved in: 4-6 weeks  Disability index score of 10% or less for the LEFS to assist with return top prior level of function.   Status: [] Progressing: [] Met: [] Not Met: [] Adjusted  Patient will be able to walk >2 miles without increased R hip symptoms or restriction.   Status: [] Progressing: [] Met: [] Not Met: [] Adjusted  Patient will be able to go up at least 2 flights of normal 8\" height stairs with reciprocal mechanics without increased R hip pain or restriction.    Status: [] Progressing: [] Met: [] Not Met: [] Adjusted  Patient will be able to navigate inclines/declines without increased R hip symptoms or restriction.    Status: []

## 2024-09-12 ENCOUNTER — PATIENT MESSAGE (OUTPATIENT)
Dept: ORTHOPEDIC SURGERY | Age: 69
End: 2024-09-12

## 2024-09-12 DIAGNOSIS — M16.11 PRIMARY OSTEOARTHRITIS OF RIGHT HIP: Primary | ICD-10-CM

## 2024-09-12 DIAGNOSIS — M25.551 PAIN OF RIGHT HIP: ICD-10-CM

## 2024-10-14 ENCOUNTER — OFFICE VISIT (OUTPATIENT)
Dept: ORTHOPEDIC SURGERY | Age: 69
End: 2024-10-14
Payer: MEDICARE

## 2024-10-14 VITALS — BODY MASS INDEX: 21.99 KG/M2 | HEIGHT: 65 IN | WEIGHT: 132 LBS

## 2024-10-14 DIAGNOSIS — S76.019A RUPTURE OF HIP ABDUCTOR TENDON: Primary | ICD-10-CM

## 2024-10-14 DIAGNOSIS — M25.851 FEMOROACETABULAR IMPINGEMENT OF RIGHT HIP: ICD-10-CM

## 2024-10-14 PROCEDURE — 99214 OFFICE O/P EST MOD 30 MIN: CPT | Performed by: ORTHOPAEDIC SURGERY

## 2024-10-14 PROCEDURE — 1036F TOBACCO NON-USER: CPT | Performed by: ORTHOPAEDIC SURGERY

## 2024-10-14 PROCEDURE — G8427 DOCREV CUR MEDS BY ELIG CLIN: HCPCS | Performed by: ORTHOPAEDIC SURGERY

## 2024-10-14 PROCEDURE — 1123F ACP DISCUSS/DSCN MKR DOCD: CPT | Performed by: ORTHOPAEDIC SURGERY

## 2024-10-14 PROCEDURE — G8484 FLU IMMUNIZE NO ADMIN: HCPCS | Performed by: ORTHOPAEDIC SURGERY

## 2024-10-14 PROCEDURE — G8420 CALC BMI NORM PARAMETERS: HCPCS | Performed by: ORTHOPAEDIC SURGERY

## 2024-10-14 PROCEDURE — 3017F COLORECTAL CA SCREEN DOC REV: CPT | Performed by: ORTHOPAEDIC SURGERY

## 2024-10-14 PROCEDURE — 1090F PRES/ABSN URINE INCON ASSESS: CPT | Performed by: ORTHOPAEDIC SURGERY

## 2024-10-14 PROCEDURE — G8400 PT W/DXA NO RESULTS DOC: HCPCS | Performed by: ORTHOPAEDIC SURGERY

## 2024-10-14 NOTE — PROGRESS NOTES
Chief Complaint  Hip Pain (TR MRI right hip)      History of Present Illness:  Kirstin Chávez is a pleasant 69 y.o. female here for reassessment of right buttock dominant pain and MRI review.  Patient continues to have deep posterior buttocks pain that is constant, she is having issues with walking long distances laying on her side or sitting.  Patient also has previously underwent a right hip PRP injection with Dr. Madrigal on 7/1/2024 which did not provide her pain relief, she also had intra-articular steroid injection which she feels like did help her pain with 90 % relief of symptoms at the time. Symptoms constant, worse with prolonged activity. The pain is significantly limiting her recreational activity.       Medical History:  Patient's medications, allergies, past medical, surgical, social and family histories were reviewed and updated as appropriate.    Pain Assessment  Location of Pain: Hip  Location Modifiers: Right  Severity of Pain: 5  Quality of Pain: Sharp, Dull, Aching, Other (Comment) (BURNING)  Frequency of Pain: Constant  Aggravating Factors: Walking, Stairs  Limiting Behavior: Yes  Relieving Factors: Rest, Ice, Heat  Result of Injury: Yes  Work-Related Injury: No  Are there other pain locations you wish to document?: No  ROS: Review of systems reviewed from Patient History Form completed today and available in the patient's chart under the Media tab.      Pertinent items are noted in HPI  Review of systems reviewed from Patient History Form completed today and available in the patient's chart under the Media tab.       Vital Signs:  Ht 1.651 m (5' 5\")   Wt 59.9 kg (132 lb)   BMI 21.97 kg/m²         Neuro: Alert & oriented x 3,  normal,  no focal deficits noted. Normal affect.  Eyes: sclera clear  Ears: Normal external ear  Mouth:  No perioral lesions  Pulm: Respirations unlabored and regular  Pulse: Extremities well perfused. 2+ peripheral pulses.  Skin: Warm. No

## 2024-10-24 ENCOUNTER — TELEPHONE (OUTPATIENT)
Dept: ORTHOPEDIC SURGERY | Age: 69
End: 2024-10-24

## 2024-10-24 NOTE — TELEPHONE ENCOUNTER
Pushed images from [facility] to [facility].

## 2025-03-27 ENCOUNTER — HOSPITAL ENCOUNTER (OUTPATIENT)
Dept: PHYSICAL THERAPY | Age: 70
Setting detail: THERAPIES SERIES
Discharge: HOME | End: 2025-03-27
Payer: MEDICARE

## 2025-03-27 DIAGNOSIS — M25.551 RIGHT HIP PAIN: Primary | ICD-10-CM

## 2025-03-27 PROCEDURE — 97161 PT EVAL LOW COMPLEX 20 MIN: CPT

## 2025-03-27 PROCEDURE — 97140 MANUAL THERAPY 1/> REGIONS: CPT

## 2025-03-27 PROCEDURE — 97110 THERAPEUTIC EXERCISES: CPT

## 2025-03-27 NOTE — PLAN OF CARE
[facility] - Outpatient Rehabilitation and Therapy: [address] [place] [address] office: [phone] fax: [phone]     Physical Therapy Initial Evaluation Certification      Dear BRANDON Duncan ,    We had the pleasure of evaluating the following patient for physical therapy services at [facility].  A summary of our findings can be found in the initial assessment below.  This includes our plan of care.  If you have any questions or concerns regarding these findings, please do not hesitate to contact me at the office phone number listed above.  Thank you for the referral.     Physician Signature:_______________________________Date:__________________  By signing above (or electronic signature), therapist’s plan is approved by physician       Physical Therapy: TREATMENT/PROGRESS NOTE   Patient: Kirstin Chávez (69 y.o. female)   Examination Date: 2025   :  1955 MRN: [id number]   Visit #: 1   Insurance Allowable Auth Needed   MN []Yes    []No    Insurance: Payor: MEDICARE / Plan: MEDICARE PART A AND B / Product Type: *No Product type* /   Insurance ID: [id number] - (Medicare)  Secondary Insurance (if applicable): [facility]   Treatment Diagnosis:     ICD-10-CM    1. Right hip pain  M25.551          Medical Diagnosis:  Unilateral primary osteoarthritis, right hip [M16.11]   Referring Physician: Catherine Valdez PA  PCP: Unknown, Provider, ANP     Plan of care signed (Y/N):     Date of Patient follow up with Physician:      Plan of Care Report: EVAL today  POC update due: (10 visits /OR AUTH LIMITS, whichever is less)  2025                                             Medical History:  Comorbidities:  None  Relevant Medical History:  L wrist fracture surgery 2022. Borderline high BP with no medical intervention other than CPAP use for sleep apnea. 2018 Cervical fusion C5-C7. 2013 arthro surface implant R great toe.

## 2025-04-02 ENCOUNTER — HOSPITAL ENCOUNTER (OUTPATIENT)
Dept: PHYSICAL THERAPY | Age: 70
Setting detail: THERAPIES SERIES
Discharge: HOME | End: 2025-04-02
Payer: MEDICARE

## 2025-04-02 PROCEDURE — 97140 MANUAL THERAPY 1/> REGIONS: CPT

## 2025-04-02 PROCEDURE — 97110 THERAPEUTIC EXERCISES: CPT

## 2025-04-02 NOTE — FLOWSHEET NOTE
[facility] - Outpatient Rehabilitation and Therapy: [address] [place] [address] office: [phone] fax: [phone]         Physical Therapy: TREATMENT/PROGRESS NOTE   Patient: Kirstin Chávez (69 y.o. female)   Examination Date: 2025   :  1955 MRN: [id number]   Visit #: 2   Insurance Allowable Auth Needed   MN []Yes    []No    Insurance: Payor: MEDICARE / Plan: MEDICARE PART A AND B / Product Type: *No Product type* /   Insurance ID: [id number] - (Medicare)  Secondary Insurance (if applicable): [facility]   Treatment Diagnosis:     ICD-10-CM    1. Right hip pain  M25.551          Medical Diagnosis:  Unilateral primary osteoarthritis, right hip [M16.11]   Referring Physician: Catherine Valdez PA  PCP: Unknown, Provider, ANP     Plan of care signed (Y/N):     Date of Patient follow up with Physician:      Plan of Care Report: NO  POC update due: (10 visits /OR AUTH LIMITS, whichever is less)  2025                                             Medical History:  Comorbidities:  None  Relevant Medical History:  L wrist fracture surgery 2022. Borderline high BP with no medical intervention other than CPAP use for sleep apnea. 2018 Cervical fusion C5-C7. 2013 arthro surface implant R great toe.                                                                         Precautions/ Contra-indications:           Latex allergy:  NO  Pacemaker:    NO  Contraindications for Manipulation: None  Date of Surgery: 3/20/25  Other:    Red Flags:  None    Suicide Screening:   The patient did not verbalize a primary behavioral concern, suicidal ideation, suicidal intent, or demonstrate suicidal behaviors.    Preferred Language for Healthcare:   [x] English       [] other:    SUBJECTIVE EXAMINATION     Patient stated complaint: Kirstin had R hip JESENIA on 3/20/25. Notes she is doing ok overall, walking well with a cane. Pain when she wakes up and stretches to squeeze all her

## 2025-04-04 ENCOUNTER — HOSPITAL ENCOUNTER (OUTPATIENT)
Dept: PHYSICAL THERAPY | Age: 70
Setting detail: THERAPIES SERIES
Discharge: HOME | End: 2025-04-04
Payer: MEDICARE

## 2025-04-04 PROCEDURE — 97110 THERAPEUTIC EXERCISES: CPT

## 2025-04-04 PROCEDURE — 97140 MANUAL THERAPY 1/> REGIONS: CPT

## 2025-04-04 NOTE — FLOWSHEET NOTE
patient to sit, squat to work around house.   [] Progressing: [] Met: [] Not Met: [] Adjusted  Patient will demonstrate increased Strength of R hip to at least 4+/5 throughout without pain to allow for proper functional mobility to enable patient to return to maintain single leg stance without strain on back and hip to do household and exercise tasks.   [] Progressing: [] Met: [] Not Met: [] Adjusted  Patient will return to walking 2 miles without increased symptoms or restriction.   [] Progressing: [] Met: [] Not Met: [] Adjusted        Overall Progression Towards Functional goals/ Treatment Progress Update:  [] Patient is progressing as expected towards functional goals listed.    [] Progression is slowed due to complexities/Impairments listed.  [] Progression has been slowed due to co-morbidities.  [x] Plan just implemented, too soon (<30days) to assess goals progression   [] Goals require adjustment due to lack of progress  [] Patient is not progressing as expected and requires additional follow up with physician  [] Other:     TREATMENT PLAN     Frequency/Duration: 2x/week for 8-10 weeks for the following treatment interventions:    Interventions:  Therapeutic Exercise (74527) including: strength training, ROM, and functional mobility  Therapeutic Activities (88208) including: functional mobility training and education.  Neuromuscular Re-education (04769) activation and proprioception, including postural re-education.    Gait Training (62266) for normalization of ambulation patterns and AD training.   Manual Therapy (07011) as indicated to include: Passive Range of Motion and Soft Tissue Mobilization  Patient education on joint protection and post op    Plan:  continue progression- watch extension.     Electronically Signed by Meenu Lew PT  Date: 04/04/2025     Note: Portions of this note have been templated and/or copied from initial evaluation, reassessments and prior notes for documentation

## 2025-04-07 ENCOUNTER — HOSPITAL ENCOUNTER (OUTPATIENT)
Dept: PHYSICAL THERAPY | Age: 70
Setting detail: THERAPIES SERIES
Discharge: HOME | End: 2025-04-07
Payer: MEDICARE

## 2025-04-07 PROCEDURE — 97140 MANUAL THERAPY 1/> REGIONS: CPT

## 2025-04-07 PROCEDURE — 97110 THERAPEUTIC EXERCISES: CPT

## 2025-04-07 NOTE — FLOWSHEET NOTE
[facility] - Outpatient Rehabilitation and Therapy: [address] [place] [address] office: [phone] fax: [phone]         Physical Therapy: TREATMENT/PROGRESS NOTE   Patient: Kirstin Chávez (69 y.o. female)   Examination Date: 2025   :  1955 MRN: [id number]   Visit #: 4   Insurance Allowable Auth Needed   MN []Yes    []No    Insurance: Payor: MEDICARE / Plan: MEDICARE PART A AND B / Product Type: *No Product type* /   Insurance ID: [id number] - (Medicare)  Secondary Insurance (if applicable): [employer] [facility]   Treatment Diagnosis:     ICD-10-CM    1. Right hip pain  M25.551          Medical Diagnosis:  Unilateral primary osteoarthritis, right hip [M16.11]   Referring Physician: Catherine Valdez PA  PCP: Unknown, Provider     Plan of care signed (Y/N):     Date of Patient follow up with Physician:      Plan of Care Report: NO  POC update due: (10 visits /OR AUTH LIMITS, whichever is less)  2025                                             Medical History:  Comorbidities:  None  Relevant Medical History:  L wrist fracture surgery 2022. Borderline high BP with no medical intervention other than CPAP use for sleep apnea. 2018 Cervical fusion C5-C7. 2013 arthro surface implant R great toe.                                                                         Precautions/ Contra-indications:           Latex allergy:  NO  Pacemaker:    NO  Contraindications for Manipulation: None  Date of Surgery: 3/20/25  Other:    Red Flags:  None    Suicide Screening:   The patient did not verbalize a primary behavioral concern, suicidal ideation, suicidal intent, or demonstrate suicidal behaviors.    Preferred Language for Healthcare:   [x] English       [] other:    SUBJECTIVE EXAMINATION     Patient stated complaint: Kirstin notes that her back/SI area was sore for about 2 days after last session. Feels like it was more from the SLS exercise than working on her back/SI

## 2025-04-10 ENCOUNTER — HOSPITAL ENCOUNTER (OUTPATIENT)
Dept: PHYSICAL THERAPY | Age: 70
Setting detail: THERAPIES SERIES
Discharge: HOME | End: 2025-04-10
Payer: MEDICARE

## 2025-04-10 PROCEDURE — 97140 MANUAL THERAPY 1/> REGIONS: CPT

## 2025-04-10 PROCEDURE — 97110 THERAPEUTIC EXERCISES: CPT

## 2025-04-10 NOTE — FLOWSHEET NOTE
[facility] - Outpatient Rehabilitation and Therapy: [address] [place] [address] office: [phone] fax: [phone]         Physical Therapy: TREATMENT/PROGRESS NOTE   Patient: Kirstin Chávez (69 y.o. female)   Examination Date: 04/10/2025   :  1955 MRN: [id number]   Visit #: 5   Insurance Allowable Auth Needed   MN []Yes    []No    Insurance: Payor: MEDICARE / Plan: MEDICARE PART A AND B / Product Type: *No Product type* /   Insurance ID: [id number] - (Medicare)  Secondary Insurance (if applicable): [facility]   Treatment Diagnosis:     ICD-10-CM    1. Right hip pain  M25.551          Medical Diagnosis:  Unilateral primary osteoarthritis, right hip [M16.11]   Referring Physician: Catherine Valdez PA  PCP: Unknown, Provider     Plan of care signed (Y/N):     Date of Patient follow up with Physician:      Plan of Care Report: NO  POC update due: (10 visits /OR AUTH LIMITS, whichever is less)  2025                                             Medical History:  Comorbidities:  None  Relevant Medical History:  L wrist fracture surgery 2022. Borderline high BP with no medical intervention other than CPAP use for sleep apnea. 2018 Cervical fusion C5-C7. 2013 arthro surface implant R great toe.                                                                         Precautions/ Contra-indications:           Latex allergy:  NO  Pacemaker:    NO  Contraindications for Manipulation: None  Date of Surgery: 3/20/25  Other:    Red Flags:  None    Suicide Screening:   The patient did not verbalize a primary behavioral concern, suicidal ideation, suicidal intent, or demonstrate suicidal behaviors.    Preferred Language for Healthcare:   [x] English       [] other:    SUBJECTIVE EXAMINATION     Patient stated complaint:Doing much better today- no real back pain, hip feeling stronger. Not really using cane at all.        Test used Initial score  3/27/25 04/10/2025   Pain

## 2025-04-14 ENCOUNTER — HOSPITAL ENCOUNTER (OUTPATIENT)
Dept: PHYSICAL THERAPY | Age: 70
Setting detail: THERAPIES SERIES
Discharge: HOME | End: 2025-04-14
Payer: MEDICARE

## 2025-04-14 PROCEDURE — 97110 THERAPEUTIC EXERCISES: CPT

## 2025-04-14 PROCEDURE — 97140 MANUAL THERAPY 1/> REGIONS: CPT

## 2025-04-14 NOTE — FLOWSHEET NOTE
[facility] - Outpatient Rehabilitation and Therapy: [address] [place] [address] office: [phone] fax: [phone]         Physical Therapy: TREATMENT/PROGRESS NOTE   Patient: Kirstin Chávez (69 y.o. female)   Examination Date: 2025   :  1955 MRN: [id number]   Visit #: 6   Insurance Allowable Auth Needed   MN []Yes    []No    Insurance: Payor: MEDICARE / Plan: MEDICARE PART A AND B / Product Type: *No Product type* /   Insurance ID: [id number] - (Medicare)  Secondary Insurance (if applicable): [employer] [facility]   Treatment Diagnosis:     ICD-10-CM    1. Right hip pain  M25.551          Medical Diagnosis:  Unilateral primary osteoarthritis, right hip [M16.11]   Referring Physician: Catherine Valdez PA  PCP: Unknown, Provider     Plan of care signed (Y/N):     Date of Patient follow up with Physician:      Plan of Care Report: NO  POC update due: (10 visits /OR AUTH LIMITS, whichever is less)  2025                                             Medical History:  Comorbidities:  None  Relevant Medical History:  L wrist fracture surgery 2022. Borderline high BP with no medical intervention other than CPAP use for sleep apnea. 2018 Cervical fusion C5-C7. 2013 arthro surface implant R great toe.                                                                         Precautions/ Contra-indications:           Latex allergy:  NO  Pacemaker:    NO  Contraindications for Manipulation: None  Date of Surgery: 3/20/25  Other:    Red Flags:  None    Suicide Screening:   The patient did not verbalize a primary behavioral concern, suicidal ideation, suicidal intent, or demonstrate suicidal behaviors.    Preferred Language for Healthcare:   [x] English       [] other:    SUBJECTIVE EXAMINATION     Patient stated complaint:Day after last session was sore for an entire day but then on Saturday [place] around [place] and felt much better after.        Test used Initial

## 2025-04-16 ENCOUNTER — HOSPITAL ENCOUNTER (OUTPATIENT)
Dept: PHYSICAL THERAPY | Age: 70
Setting detail: THERAPIES SERIES
Discharge: HOME | End: 2025-04-16
Payer: MEDICARE

## 2025-04-16 PROCEDURE — 97140 MANUAL THERAPY 1/> REGIONS: CPT

## 2025-04-16 PROCEDURE — 97110 THERAPEUTIC EXERCISES: CPT

## 2025-04-16 NOTE — FLOWSHEET NOTE
(44333)     Iontophoresis (82901)    VASO (12070)     Ultrasound (04927)    Group Therapy (01528)     Estim Attended (17960)    Canalith Repositioning (45105)     Physical Performance Test (55333)    Custom orthotic ()     Other:    Other:    Total Timed Code Tx Minutes 35 2       Total Treatment Minutes 35        Charge Justification:  (06351) THERAPEUTIC EXERCISE - Provided verbal/tactile cueing for HEP and/or activities related to strengthening, flexibility, endurance, ROM performed to prevent loss of range of motion, maintain or improve muscular strength or increase flexibility, following either an injury or surgery.   (08769) MANUAL THERAPY -  Manual therapy techniques, 1 or more regions, each 15 minutes (Mobilization/manipulation, manual lymphatic drainage, manual traction) for the purpose of modulating pain, promoting relaxation,  increasing ROM, reducing/eliminating soft tissue swelling/inflammation/restriction, improving soft tissue extensibility and allowing for proper ROM for normal function with self care, mobility, lifting and ambulation    GOALS     Patient stated goal: walk and exercise as normal  [] Progressing: [] Met: [] Not Met: [] Adjusted    Therapist goals for Patient:   Short Term Goals: To be achieved in: 2 weeks  1Independent in HEP and progression per patient tolerance, in order to prevent re-injury.   [] Progressing: [] Met: [] Not Met: [] Adjusted  Patient will have a decrease in pain to <1-2/10 to facilitate improvement in movement, function, and ADLs as indicated by Functional Deficits.  [] Progressing: [] Met: [] Not Met: [] Adjusted    IF APPLICABLE:  [] Patient to demonstrate independence in wear and care for custom orthotic device. (Only if applicable for orthotic eval)     Long Term Goals: To be achieved in: 8 weeks  Disability index score of 12% or less for the LEFS to assist with reaching prior level of function with activities such as walking, stair climbing, squatting.  []

## 2025-04-21 ENCOUNTER — HOSPITAL ENCOUNTER (OUTPATIENT)
Dept: PHYSICAL THERAPY | Age: 70
Setting detail: THERAPIES SERIES
Discharge: HOME | End: 2025-04-21
Payer: MEDICARE

## 2025-04-21 PROCEDURE — 97140 MANUAL THERAPY 1/> REGIONS: CPT

## 2025-04-21 PROCEDURE — 97110 THERAPEUTIC EXERCISES: CPT

## 2025-04-21 NOTE — FLOWSHEET NOTE
[facility] - Outpatient Rehabilitation and Therapy: [address] [place] [address] office: [phone] fax: [phone]         Physical Therapy: TREATMENT/PROGRESS NOTE   Patient: Kirstin Chávez (69 y.o. female)   Examination Date: 2025   :  1955 MRN: [id number]   Visit #: 8   Insurance Allowable Auth Needed   MN []Yes    []No    Insurance: Payor: MEDICARE / Plan: MEDICARE PART A AND B / Product Type: *No Product type* /   Insurance ID: [id number] - (Medicare)  Secondary Insurance (if applicable): [employer] [facility]   Treatment Diagnosis:     ICD-10-CM    1. Right hip pain  M25.551          Medical Diagnosis:  Unilateral primary osteoarthritis, right hip [M16.11]   Referring Physician: Catherine Valdez PA  PCP: Unknown, Provider     Plan of care signed (Y/N):     Date of Patient follow up with Physician:      Plan of Care Report: NO  POC update due: (10 visits /OR AUTH LIMITS, whichever is less)  2025                                             Medical History:  Comorbidities:  None  Relevant Medical History:  L wrist fracture surgery 2022. Borderline high BP with no medical intervention other than CPAP use for sleep apnea. 2018 Cervical fusion C5-C7. 2013 arthro surface implant R great toe.                                                                         Precautions/ Contra-indications:           Latex allergy:  NO  Pacemaker:    NO  Contraindications for Manipulation: None  Date of Surgery: 3/20/25  Other:    Red Flags:  None    Suicide Screening:   The patient did not verbalize a primary behavioral concern, suicidal ideation, suicidal intent, or demonstrate suicidal behaviors.    Preferred Language for Healthcare:   [x] English       [] other:    SUBJECTIVE EXAMINATION     Patient stated complaint: Notes that she was sore in her groin after last session. Had to drive an hour down and back to [place] and then sit for 2 hours while she was there, so

## 2025-04-24 ENCOUNTER — HOSPITAL ENCOUNTER (OUTPATIENT)
Dept: PHYSICAL THERAPY | Age: 70
Setting detail: THERAPIES SERIES
Discharge: HOME | End: 2025-04-24
Payer: MEDICARE

## 2025-04-24 PROCEDURE — 97140 MANUAL THERAPY 1/> REGIONS: CPT

## 2025-04-24 PROCEDURE — 97110 THERAPEUTIC EXERCISES: CPT

## 2025-04-24 NOTE — PLAN OF CARE
[facility] - Outpatient Rehabilitation and Therapy: [address] [place] [address] office: [phone] fax: [phone]       Physical Therapy Re-Certification Plan of Care/MD UPDATE      Dear  Huyen TAYLOR,    We had the pleasure of treating the following patient for physical therapy services at [facility].  A summary of our findings can be found in the updated assessment below.  This includes our plan of care.  If you have any questions or concerns regarding these findings, please do not hesitate to contact me at the office phone number checked above.  Thank you for the referral.     Physician Signature:________________________________Date:__________________  By signing above (or electronic signature), therapist’s plan is approved by physician      Current Functional Status:   Kirstin Chávez 1955 continues to present with functional deficits in strength symmetry and muscle activation  limiting ability with managing community ambulation, walking up/down stairs, and ADLs .  During therapy this date, patient required modification of technique, progression of exercises and program, and manual interventions for improving proper muscle recruitment and activation/motor control patterns and modulating pain. Patient will continue to benefit from ongoing evaluation and advanced clinical decision from a Physical Therapist to improve pain control, muscle strength, and functional mobility to safely return to PLOF without symptoms or restrictions.    Overall Response to Treatment:   []Patient is responding well to treatment and improvement is noted with regards to goals   []Patient should continue to improve in reasonable time if they continue HEP   []Patient has plateaued and is no longer responding to skilled PT intervention    []Patient is getting worse and would benefit from return to referring MD   []Patient unable to adhere to initial POC   [x]Other: hip itself

## 2025-04-29 ENCOUNTER — HOSPITAL ENCOUNTER (OUTPATIENT)
Dept: PHYSICAL THERAPY | Age: 70
Setting detail: THERAPIES SERIES
Discharge: HOME | End: 2025-04-29
Payer: MEDICARE

## 2025-04-29 PROCEDURE — 97140 MANUAL THERAPY 1/> REGIONS: CPT

## 2025-04-29 PROCEDURE — 97110 THERAPEUTIC EXERCISES: CPT

## 2025-04-29 NOTE — FLOWSHEET NOTE
promoting relaxation,  increasing ROM, reducing/eliminating soft tissue swelling/inflammation/restriction, improving soft tissue extensibility and allowing for proper ROM for normal function with self care, mobility, lifting and ambulation    GOALS     Patient stated goal: walk and exercise as normal  [] Progressing: [] Met: [] Not Met: [] Adjusted    Therapist goals for Patient:   Short Term Goals: To be achieved in: 2 weeks  1Independent in HEP and progression per patient tolerance, in order to prevent re-injury.   [] Progressing: [] Met: [] Not Met: [] Adjusted  Patient will have a decrease in pain to <1-2/10 to facilitate improvement in movement, function, and ADLs as indicated by Functional Deficits.  [] Progressing: [] Met: [] Not Met: [] Adjusted    IF APPLICABLE:  [] Patient to demonstrate independence in wear and care for custom orthotic device. (Only if applicable for orthotic eval)     Long Term Goals: To be achieved in: 8 weeks  Disability index score of 12% or less for the LEFS to assist with reaching prior level of function with activities such as walking, stair climbing, squatting.  [] Progressing: [] Met: [] Not Met: [] Adjusted  Patient will demonstrate increased AROM of R hip to WNL without pain to allow for proper joint functioning to enable patient to sit, squat to work around house.   [] Progressing: [] Met: [] Not Met: [] Adjusted  Patient will demonstrate increased Strength of R hip to at least 4+/5 throughout without pain to allow for proper functional mobility to enable patient to return to maintain single leg stance without strain on back and hip to do household and exercise tasks.   [] Progressing: [] Met: [] Not Met: [] Adjusted  Patient will return to walking 2 miles without increased symptoms or restriction.   [] Progressing: [] Met: [] Not Met: [] Adjusted        Overall Progression Towards Functional goals/ Treatment Progress Update:  [x] Patient is progressing as expected towards

## 2025-04-30 ENCOUNTER — HOSPITAL ENCOUNTER (OUTPATIENT)
Dept: PHYSICAL THERAPY | Age: 70
Setting detail: THERAPIES SERIES
Discharge: HOME | End: 2025-04-30
Payer: MEDICARE

## 2025-04-30 PROCEDURE — 97140 MANUAL THERAPY 1/> REGIONS: CPT

## 2025-04-30 PROCEDURE — 97110 THERAPEUTIC EXERCISES: CPT

## 2025-04-30 NOTE — FLOWSHEET NOTE
is slowed due to complexities/Impairments listed.  [] Progression has been slowed due to co-morbidities.  [] Plan just implemented, too soon (<30days) to assess goals progression   [] Goals require adjustment due to lack of progress  [] Patient is not progressing as expected and requires additional follow up with physician  [] Other:     TREATMENT PLAN     Frequency/Duration: 2x/week for 8-10 weeks for the following treatment interventions:    Interventions:  Therapeutic Exercise (87845) including: strength training, ROM, and functional mobility  Therapeutic Activities (04888) including: functional mobility training and education.  Neuromuscular Re-education (17026) activation and proprioception, including postural re-education.    Gait Training (30932) for normalization of ambulation patterns and AD training.   Manual Therapy (12037) as indicated to include: Passive Range of Motion and Soft Tissue Mobilization  Patient education on joint protection and post op    Plan:  continue progression- work add/pectineus release as able-  see mD for follow up    Electronically Signed by Meenu Lew PT  Date: 04/30/2025     Note: Portions of this note have been templated and/or copied from initial evaluation, reassessments and prior notes for documentation efficiency.    Note: If patient does not return for scheduled/recommended follow up visits, this note will serve as a discharge from care along with the most recent update on progress.

## 2025-05-12 ENCOUNTER — HOSPITAL ENCOUNTER (OUTPATIENT)
Dept: PHYSICAL THERAPY | Age: 70
Setting detail: THERAPIES SERIES
Discharge: HOME | End: 2025-05-12
Payer: MEDICARE

## 2025-05-12 PROCEDURE — 97110 THERAPEUTIC EXERCISES: CPT

## 2025-05-12 PROCEDURE — 97140 MANUAL THERAPY 1/> REGIONS: CPT

## 2025-05-12 NOTE — FLOWSHEET NOTE
[facility] - Outpatient Rehabilitation and Therapy: [address] [place] [address] office: [phone] fax: [phone]           Physical Therapy: TREATMENT/PROGRESS NOTE   Patient: Kirstin Chávez (69 y.o. female)   Examination Date: 2025   :  1955 MRN: [id number]   Visit #: 12   Insurance Allowable Auth Needed   MN []Yes    []No    Insurance: Payor: MEDICARE / Plan: MEDICARE PART A AND B / Product Type: *No Product type* /   Insurance ID: [id number] - (Medicare)  Secondary Insurance (if applicable): [employer] [facility]   Treatment Diagnosis:     ICD-10-CM    1. Right hip pain  M25.551          Medical Diagnosis:  Unilateral primary osteoarthritis, right hip [M16.11]   Referring Physician: Catherine Valdez PA  PCP: Unknown, Provider, APRN     Plan of care signed (Y/N):     Date of Patient follow up with Physician:      Plan of Care Report: NO  POC update due: (10 visits /OR AUTH LIMITS, whichever is less)  2025                                             Medical History:  Comorbidities:  None  Relevant Medical History:  L wrist fracture surgery 2022. Borderline high BP with no medical intervention other than CPAP use for sleep apnea. 2018 Cervical fusion C5-C7. 2013 arthro surface implant R great toe.                                                                         Precautions/ Contra-indications:           Latex allergy:  NO  Pacemaker:    NO  Contraindications for Manipulation: None  Date of Surgery: 3/20/25  Other:    Red Flags:  None    Suicide Screening:   The patient did not verbalize a primary behavioral concern, suicidal ideation, suicidal intent, or demonstrate suicidal behaviors.    Preferred Language for Healthcare:   [x] English       [] other:    SUBJECTIVE EXAMINATION     Patient stated complaint: Feeling better overall in back and groin- mostly tight over incisional area.       Test used Initial score  3/27/25 2025   Pain Summary

## 2025-05-14 ENCOUNTER — HOSPITAL ENCOUNTER (OUTPATIENT)
Dept: PHYSICAL THERAPY | Age: 70
Setting detail: THERAPIES SERIES
Discharge: HOME | End: 2025-05-14
Payer: MEDICARE

## 2025-05-14 PROCEDURE — 97140 MANUAL THERAPY 1/> REGIONS: CPT

## 2025-05-14 PROCEDURE — 97110 THERAPEUTIC EXERCISES: CPT

## 2025-05-14 NOTE — FLOWSHEET NOTE
Progressing: [] Met: [] Not Met: [] Adjusted    Therapist goals for Patient:   Short Term Goals: To be achieved in: 2 weeks  1Independent in HEP and progression per patient tolerance, in order to prevent re-injury.   [] Progressing: [] Met: [] Not Met: [] Adjusted  Patient will have a decrease in pain to <1-2/10 to facilitate improvement in movement, function, and ADLs as indicated by Functional Deficits.  [] Progressing: [] Met: [] Not Met: [] Adjusted    IF APPLICABLE:  [] Patient to demonstrate independence in wear and care for custom orthotic device. (Only if applicable for orthotic eval)     Long Term Goals: To be achieved in: 8 weeks  Disability index score of 12% or less for the LEFS to assist with reaching prior level of function with activities such as walking, stair climbing, squatting.  [] Progressing: [] Met: [] Not Met: [] Adjusted  Patient will demonstrate increased AROM of R hip to WNL without pain to allow for proper joint functioning to enable patient to sit, squat to work around house.   [] Progressing: [] Met: [] Not Met: [] Adjusted  Patient will demonstrate increased Strength of R hip to at least 4+/5 throughout without pain to allow for proper functional mobility to enable patient to return to maintain single leg stance without strain on back and hip to do household and exercise tasks.   [] Progressing: [] Met: [] Not Met: [] Adjusted  Patient will return to walking 2 miles without increased symptoms or restriction.   [] Progressing: [] Met: [] Not Met: [] Adjusted        Overall Progression Towards Functional goals/ Treatment Progress Update:  [x] Patient is progressing as expected towards functional goals listed.    [] Progression is slowed due to complexities/Impairments listed.  [] Progression has been slowed due to co-morbidities.  [] Plan just implemented, too soon (<30days) to assess goals progression   [] Goals require adjustment due to lack of progress  [] Patient is not progressing as

## 2025-05-19 ENCOUNTER — HOSPITAL ENCOUNTER (OUTPATIENT)
Dept: PHYSICAL THERAPY | Age: 70
Setting detail: THERAPIES SERIES
Discharge: HOME | End: 2025-05-19
Payer: MEDICARE

## 2025-05-19 PROCEDURE — 97140 MANUAL THERAPY 1/> REGIONS: CPT

## 2025-05-19 PROCEDURE — 97110 THERAPEUTIC EXERCISES: CPT

## 2025-05-19 NOTE — FLOWSHEET NOTE
[facility] - Outpatient Rehabilitation and Therapy: [address] [place] [address] office: [phone] fax: [phone]           Physical Therapy: TREATMENT/PROGRESS NOTE   Patient: Kirstin Chávez (69 y.o. female)   Examination Date: 2025   :  1955 MRN: [id number]   Visit #: 14   Insurance Allowable Auth Needed   MN []Yes    []No    Insurance: Payor: MEDICARE / Plan: MEDICARE PART A AND B / Product Type: *No Product type* /   Insurance ID: [id number] - (Medicare)  Secondary Insurance (if applicable): [facility]   Treatment Diagnosis:     ICD-10-CM    1. Right hip pain  M25.551          Medical Diagnosis:  Unilateral primary osteoarthritis, right hip [M16.11]   Referring Physician: Catherine Valdez PA  PCP: Unknown, Provider     Plan of care signed (Y/N):     Date of Patient follow up with Physician:      Plan of Care Report: NO  POC update due: (10 visits /OR AUTH LIMITS, whichever is less)  2025                                             Medical History:  Comorbidities:  None  Relevant Medical History:  L wrist fracture surgery 2022. Borderline high BP with no medical intervention other than CPAP use for sleep apnea. 2018 Cervical fusion C5-C7. 2013 arthro surface implant R great toe.                                                                         Precautions/ Contra-indications:           Latex allergy:  NO  Pacemaker:    NO  Contraindications for Manipulation: None  Date of Surgery: 3/20/25  Other:    Red Flags:  None    Suicide Screening:   The patient did not verbalize a primary behavioral concern, suicidal ideation, suicidal intent, or demonstrate suicidal behaviors.    Preferred Language for Healthcare:   [x] English       [] other:    SUBJECTIVE EXAMINATION     Patient stated complaint: Did a lot of walking over weekend as well as sitting and hip is doing ok. Mostly lateral tightness. Still struggling to do a leg raise.      Test used

## 2025-05-22 ENCOUNTER — HOSPITAL ENCOUNTER (OUTPATIENT)
Dept: PHYSICAL THERAPY | Age: 70
Setting detail: THERAPIES SERIES
Discharge: HOME | End: 2025-05-22
Payer: MEDICARE

## 2025-05-22 PROCEDURE — 97110 THERAPEUTIC EXERCISES: CPT

## 2025-05-22 PROCEDURE — 97140 MANUAL THERAPY 1/> REGIONS: CPT

## 2025-05-22 NOTE — FLOWSHEET NOTE
(UNTIMED) #     Therex (07622)  30 2  EVAL:LOW (28945 - Typically 20 minutes face-to-face)     Neuromusc. Re-ed (93282)    Re-Eval (12270)     Manual (89887) 15 1  Estim Unattended (75099)     Ther. Act (03156)    Mech. Traction (92004)     Gait (61142)    Dry Needle 1-2 muscle (55753)     Aquatic Therex (36166)    Dry Needle 3+ muscle (20561)     Iontophoresis (45135)    VASO (31486)     Ultrasound (12449)    Group Therapy (14240)     Estim Attended (91894)    Canalith Repositioning (22835)     Physical Performance Test (12107)    Custom orthotic ()     Other:    Other:    Total Timed Code Tx Minutes 45 3       Total Treatment Minutes 45        Charge Justification:  (66530) THERAPEUTIC EXERCISE - Provided verbal/tactile cueing for HEP and/or activities related to strengthening, flexibility, endurance, ROM performed to prevent loss of range of motion, maintain or improve muscular strength or increase flexibility, following either an injury or surgery.   (91541) MANUAL THERAPY -  Manual therapy techniques, 1 or more regions, each 15 minutes (Mobilization/manipulation, manual lymphatic drainage, manual traction) for the purpose of modulating pain, promoting relaxation,  increasing ROM, reducing/eliminating soft tissue swelling/inflammation/restriction, improving soft tissue extensibility and allowing for proper ROM for normal function with self care, mobility, lifting and ambulation    GOALS     Patient stated goal: walk and exercise as normal  [] Progressing: [] Met: [] Not Met: [] Adjusted    Therapist goals for Patient:   Short Term Goals: To be achieved in: 2 weeks  1Independent in HEP and progression per patient tolerance, in order to prevent re-injury.   [] Progressing: [] Met: [] Not Met: [] Adjusted  Patient will have a decrease in pain to <1-2/10 to facilitate improvement in movement, function, and ADLs as indicated by Functional Deficits.  [] Progressing: [] Met: [] Not Met: [] Adjusted    IF

## 2025-05-28 ENCOUNTER — HOSPITAL ENCOUNTER (OUTPATIENT)
Dept: PHYSICAL THERAPY | Age: 70
Setting detail: THERAPIES SERIES
Discharge: HOME | End: 2025-05-28
Payer: MEDICARE

## 2025-05-28 PROCEDURE — 97140 MANUAL THERAPY 1/> REGIONS: CPT

## 2025-05-28 PROCEDURE — 97110 THERAPEUTIC EXERCISES: CPT

## 2025-05-28 NOTE — FLOWSHEET NOTE
score  3/27/25 05/28/2025   Pain Summary VAS 2-8 0-1   Functional questionnaire LEFS     Other:              Pain:  Pain location: R anterior hip, quad, glute  Patient describes pain to be intermittent, dull, aching, and burning  Pain decreases with: Resting  Pain increases with: Activity and Movement, Prolonged standing, Prolonged walking, and Prolonged sitting     Living status: retired    Occupation/School:  Work/School Status: Retired  Job Duties/Demands: NA    Hand Dominance: NA    Sport/ Recreation/ Leisure/ Hobbies: cardio, walking, exercise    Review Of Systems (ROS):  [x] Performed Review of systems (Integumentary, CardioPulmonary, Neurological) by intake and observation. Intake form is in the medical record. Patient has been instructed to contact their primary care physician regarding ROS issues if not already being addressed at this time.    [x] Patient history, allergies, meds reviewed. Medical chart reviewed. See intake form.     OBJECTIVE EXAMINATION     3/27/25  ROM/Strength: (Blank cells denote NT)        Mvmt (norm) ROM L ROM R Notes MMT L MMT R Notes     LUMBAR   Flex (90)        Ext (25)        SB (25)          Lumbar Rot             HIP Flex (120)  110        Abd (45)  30    Not MMT 2' post op status, good basic activation of all LE muscles    ER (50)  35        IR (45)  45        Ext (20)  10       KNEE   Flex (140)          Ext (0)             ANKLE DF (20)          PF (50)          Inversion (30)          Eversion (20)                Exercises/Interventions     Therapeutic Ex (74310)   10 resistance Sets/time Reps Notes/Cues/Progressions   bike  5 min     Hooklying march   10 Increasing holds as able   SLR       Hooklying abd w band yellow  15    EOB- pedro position knee flex/ext   10 Can't return from ext   BOSU lunge       1/2 kneel stretch  2 10    1/2 kneel flex/ext  2 10    Standing march 2 10                                Mini squat at wall              Manual Intervention (90596) 30

## 2025-05-30 ENCOUNTER — HOSPITAL ENCOUNTER (OUTPATIENT)
Dept: PHYSICAL THERAPY | Age: 70
Setting detail: THERAPIES SERIES
Discharge: HOME | End: 2025-05-30
Payer: MEDICARE

## 2025-05-30 ENCOUNTER — APPOINTMENT (OUTPATIENT)
Dept: PHYSICAL THERAPY | Age: 70
End: 2025-05-30
Payer: MEDICARE

## 2025-05-30 PROCEDURE — 97110 THERAPEUTIC EXERCISES: CPT

## 2025-05-30 PROCEDURE — 97140 MANUAL THERAPY 1/> REGIONS: CPT

## 2025-05-30 NOTE — FLOWSHEET NOTE
[facility] - Outpatient Rehabilitation and Therapy: [address] [place] [id number] office: [phone] fax: [phone]           Physical Therapy: TREATMENT/PROGRESS NOTE   Patient: Kirstin Chávez (70 y.o. female)   Examination Date: 2025   :  1955 MRN: [id number]   Visit #: 17   Insurance Allowable Auth Needed   [place] []Yes    []No    Insurance: Payor: MEDICARE / Plan: MEDICARE PART A AND B / Product Type: *No Product type* /   Insurance ID: [id number] - (Medicare)  Secondary Insurance (if applicable): [facility]   Treatment Diagnosis:     ICD-10-CM    1. Right hip pain  M25.551          Medical Diagnosis:  Unilateral primary osteoarthritis, right hip [M16.11]   Referring Physician: Catherine Valdez PA  PCP: Unknown, Provider     Plan of care signed (Y/N):     Date of Patient follow up with Physician:      Plan of Care Report: NO  POC update due: (10 visits /OR AUTH LIMITS, whichever is less)  2025                                             Medical History:  Comorbidities:  None  Relevant Medical History:  L wrist fracture surgery 2022. Borderline high BP with no medical intervention other than CPAP use for sleep apnea. 2018 Cervical fusion C5-C7. 2013 arthro surface implant R great toe.                                                                         Precautions/ Contra-indications:           Latex allergy:  NO  Pacemaker:    NO  Contraindications for Manipulation: None  Date of Surgery: 3/20/25  Other:    Red Flags:  None    Suicide Screening:   The patient did not verbalize a primary behavioral concern, suicidal ideation, suicidal intent, or demonstrate suicidal behaviors.    Preferred Language for Healthcare:   [x] English       [] other:    SUBJECTIVE EXAMINATION     Patient stated complaint:  Patient reports that she went for 1/2 mile walk yesterday and started having pain in front of thigh. Notes that she ended up doing 25 min on

## 2025-06-03 ENCOUNTER — HOSPITAL ENCOUNTER (OUTPATIENT)
Dept: PHYSICAL THERAPY | Age: 70
Setting detail: THERAPIES SERIES
Discharge: HOME | End: 2025-06-03
Payer: MEDICARE

## 2025-06-03 PROCEDURE — 97140 MANUAL THERAPY 1/> REGIONS: CPT

## 2025-06-03 PROCEDURE — 97110 THERAPEUTIC EXERCISES: CPT

## 2025-06-03 NOTE — PLAN OF CARE
adjustment due to lack of progress  [] Patient is not progressing as expected and requires additional follow up with physician  [] Other:     TREATMENT PLAN     Frequency/Duration: 2x/week for 8-10 weeks for the following treatment interventions:    Interventions:  Therapeutic Exercise (89629) including: strength training, ROM, and functional mobility  Therapeutic Activities (08682) including: functional mobility training and education.  Neuromuscular Re-education (49328) activation and proprioception, including postural re-education.    Gait Training (10659) for normalization of ambulation patterns and AD training.   Manual Therapy (45593) as indicated to include: Passive Range of Motion and Soft Tissue Mobilization  Patient education on joint protection and post op    Plan:  continue progression- focus on core and proximal hip     Electronically Signed by Lyubov Bennett, PT  Date: 06/03/2025     Note: Portions of this note have been templated and/or copied from initial evaluation, reassessments and prior notes for documentation efficiency.    Note: If patient does not return for scheduled/recommended follow up visits, this note will serve as a discharge from care along with the most recent update on progress.

## 2025-06-04 ENCOUNTER — HOSPITAL ENCOUNTER (OUTPATIENT)
Dept: PHYSICAL THERAPY | Age: 70
Setting detail: THERAPIES SERIES
Discharge: HOME | End: 2025-06-04
Payer: MEDICARE

## 2025-06-04 PROCEDURE — 97110 THERAPEUTIC EXERCISES: CPT

## 2025-06-04 PROCEDURE — 97140 MANUAL THERAPY 1/> REGIONS: CPT

## 2025-06-04 NOTE — FLOWSHEET NOTE
[facility] - Outpatient Rehabilitation and Therapy: [address] [place] [address] office: [phone] fax: [phone]               Physical Therapy: TREATMENT/PROGRESS NOTE   Patient: Kirstin Chávez (70 y.o. female)   Examination Date: 2025   :  1955 MRN: [id number]   Visit #: 19   Insurance Allowable Auth Needed   MN []Yes    []No    Insurance: Payor: MEDICARE / Plan: MEDICARE PART A AND B / Product Type: *No Product type* /   Insurance ID: [id number] - (Medicare)  Secondary Insurance (if applicable): [employer] [facility]   Treatment Diagnosis:     ICD-10-CM    1. Right hip pain  M25.551          Medical Diagnosis:  Unilateral primary osteoarthritis, right hip [M16.11]   Referring Physician: Catherine Valdez PA  PCP: Unknown, Provider     Plan of care signed (Y/N):     Date of Patient follow up with Physician:      Plan of Care Report:  YES  POC update due: (10 visits /OR AUTH LIMITS, whichever is less)  2025                                             Medical History:  Comorbidities:  None  Relevant Medical History:  L wrist fracture surgery 2022. Borderline high BP with no medical intervention other than CPAP use for sleep apnea. 2018 Cervical fusion C5-C7. 2013 arthro surface implant R great toe.                                                                         Precautions/ Contra-indications:           Latex allergy:  NO  Pacemaker:    NO  Contraindications for Manipulation: None  Date of Surgery: 3/20/25  Other:    Red Flags:  None    Suicide Screening:   The patient did not verbalize a primary behavioral concern, suicidal ideation, suicidal intent, or demonstrate suicidal behaviors.    Preferred Language for Healthcare:   [x] English       [] other:    SUBJECTIVE EXAMINATION     Patient stated complaint: Flared up after last session. [place] fine at time but now the front and side of her hip is sore and achy.      Test used Initial score  3/27/25

## 2025-06-05 ENCOUNTER — APPOINTMENT (OUTPATIENT)
Dept: PHYSICAL THERAPY | Age: 70
End: 2025-06-05
Payer: MEDICARE

## 2025-06-09 ENCOUNTER — HOSPITAL ENCOUNTER (OUTPATIENT)
Dept: PHYSICAL THERAPY | Age: 70
Setting detail: THERAPIES SERIES
Discharge: HOME | End: 2025-06-09
Payer: MEDICARE

## 2025-06-09 PROCEDURE — 97140 MANUAL THERAPY 1/> REGIONS: CPT

## 2025-06-09 PROCEDURE — 97110 THERAPEUTIC EXERCISES: CPT

## 2025-06-09 NOTE — FLOWSHEET NOTE
including: functional mobility training and education.  Neuromuscular Re-education (97247) activation and proprioception, including postural re-education.    Gait Training (30875) for normalization of ambulation patterns and AD training.   Manual Therapy (79418) as indicated to include: Passive Range of Motion and Soft Tissue Mobilization  Patient education on joint protection and post op    Plan:  continue progression- focus on core and proximal hip     Electronically Signed by Lyubov Bennett, PT  Date: 06/09/2025     Note: Portions of this note have been templated and/or copied from initial evaluation, reassessments and prior notes for documentation efficiency.    Note: If patient does not return for scheduled/recommended follow up visits, this note will serve as a discharge from care along with the most recent update on progress.

## 2025-06-11 ENCOUNTER — HOSPITAL ENCOUNTER (OUTPATIENT)
Dept: PHYSICAL THERAPY | Age: 70
Setting detail: THERAPIES SERIES
Discharge: HOME | End: 2025-06-11
Payer: MEDICARE

## 2025-06-11 PROCEDURE — 97110 THERAPEUTIC EXERCISES: CPT

## 2025-06-11 PROCEDURE — 97140 MANUAL THERAPY 1/> REGIONS: CPT

## 2025-06-11 NOTE — PLAN OF CARE
LUMBAR   Flex (90)        Ext (25)        SB (25)          Lumbar Rot             HIP Flex (120) 120 115  5 4 Limited reps SLR    Abd (45) 45 35 Painful R supine 5 4     ER (50) 35 40  5 4     IR (45) 50 45        Ext (20) 10 15  5 4           Exercises/Interventions     Therapeutic Ex (82194)   25 resistance Sets/time Reps Notes/Cues/Progressions   bike  5 min     Hooklying march    Increasing holds as able   SLR              Leg press isos 3 angles  10s 3 ea angle    Leg ext isos  10s 3    Retro lunge at chair   5           Hooklying abd w band- iso yellow 10s 10    EOB- modified pedro stretch  5'     BOSU lunge       1/2 kneel stretch       1/2 kneel flex/ext       Standing march 2 10    Quadruped hip ext        Prone glute       Quadruped pelvic lifts off airex       Quadruped hip flexion with red band and slider              Mini squat at wall              Manual Intervention (04929) 15  TIME            PROM  5     Cupping to incision and quad       STM quad and IT band  10     Gentle prone LS and SI mobs       SL IT band mobs       NMR re-education (67312) resistance Sets/time Reps CUES NEEDED                                      Therapeutic Activity (95020)  Sets/time                                          Modalities:    No modalities applied this session    Education/Home Exercise Program: HEP discussed and performed, see exercise grid    Access Code: [email or web address]  URL: [email or web address]  Date: 04/29/2025  Prepared by: Meenu Lew    Exercises  - Supine Bridge  - 1-2 x daily - 7 x weekly - 2-3 sets - 10 reps  - Hooklying Sequential Leg March and Lower  - 1-2 x daily - 7 x weekly - 2-3 sets - 10 reps  - Bent Knee Fallouts  - 1-2 x daily - 7 x weekly - 2-3 sets - 10 reps  - Supine Hip Adduction Isometric with Ball  - 1-2 x daily - 7 x weekly - 1-2 sets - 15 reps - 15sec hold  - Standing Hip Flexion March  - 1-2 x daily - 7 x weekly - 2-3 sets - 10 reps  - Sidestepping  - 1-2 x daily - 7 x weekly